# Patient Record
Sex: FEMALE | Race: WHITE | NOT HISPANIC OR LATINO | Employment: FULL TIME | URBAN - METROPOLITAN AREA
[De-identification: names, ages, dates, MRNs, and addresses within clinical notes are randomized per-mention and may not be internally consistent; named-entity substitution may affect disease eponyms.]

---

## 2019-06-28 ENCOUNTER — HOSPITAL ENCOUNTER (EMERGENCY)
Facility: MEDICAL CENTER | Age: 54
End: 2019-06-28
Attending: EMERGENCY MEDICINE
Payer: COMMERCIAL

## 2019-06-28 VITALS
HEART RATE: 56 BPM | SYSTOLIC BLOOD PRESSURE: 105 MMHG | OXYGEN SATURATION: 100 % | HEIGHT: 67 IN | WEIGHT: 130.07 LBS | TEMPERATURE: 97.1 F | DIASTOLIC BLOOD PRESSURE: 64 MMHG | RESPIRATION RATE: 18 BRPM | BODY MASS INDEX: 20.42 KG/M2

## 2019-06-28 DIAGNOSIS — N39.0 URINARY TRACT INFECTION WITHOUT HEMATURIA, SITE UNSPECIFIED: ICD-10-CM

## 2019-06-28 DIAGNOSIS — R11.10 VOMITING AND DIARRHEA: ICD-10-CM

## 2019-06-28 DIAGNOSIS — E86.0 DEHYDRATION: ICD-10-CM

## 2019-06-28 DIAGNOSIS — R19.7 VOMITING AND DIARRHEA: ICD-10-CM

## 2019-06-28 DIAGNOSIS — A08.4 VIRAL GASTROENTERITIS: ICD-10-CM

## 2019-06-28 LAB
ALBUMIN SERPL BCP-MCNC: 4 G/DL (ref 3.2–4.9)
ALBUMIN/GLOB SERPL: 1.8 G/DL
ALP SERPL-CCNC: 64 U/L (ref 30–99)
ALT SERPL-CCNC: 24 U/L (ref 2–50)
ANION GAP SERPL CALC-SCNC: 10 MMOL/L (ref 0–11.9)
APPEARANCE UR: ABNORMAL
AST SERPL-CCNC: 26 U/L (ref 12–45)
BACTERIA #/AREA URNS HPF: ABNORMAL /HPF
BASOPHILS # BLD AUTO: 1.4 % (ref 0–1.8)
BASOPHILS # BLD: 0.05 K/UL (ref 0–0.12)
BILIRUB SERPL-MCNC: 0.4 MG/DL (ref 0.1–1.5)
BILIRUB UR QL STRIP.AUTO: NEGATIVE
BUN SERPL-MCNC: 19 MG/DL (ref 8–22)
CALCIUM SERPL-MCNC: 8.6 MG/DL (ref 8.5–10.5)
CHLORIDE SERPL-SCNC: 114 MMOL/L (ref 96–112)
CO2 SERPL-SCNC: 20 MMOL/L (ref 20–33)
COLOR UR: YELLOW
CREAT SERPL-MCNC: 0.9 MG/DL (ref 0.5–1.4)
EOSINOPHIL # BLD AUTO: 0.08 K/UL (ref 0–0.51)
EOSINOPHIL NFR BLD: 2.2 % (ref 0–6.9)
EPI CELLS #/AREA URNS HPF: ABNORMAL /HPF
ERYTHROCYTE [DISTWIDTH] IN BLOOD BY AUTOMATED COUNT: 51.3 FL (ref 35.9–50)
GLOBULIN SER CALC-MCNC: 2.2 G/DL (ref 1.9–3.5)
GLUCOSE SERPL-MCNC: 81 MG/DL (ref 65–99)
GLUCOSE UR STRIP.AUTO-MCNC: NEGATIVE MG/DL
HCT VFR BLD AUTO: 41.9 % (ref 37–47)
HGB BLD-MCNC: 13.3 G/DL (ref 12–16)
HYALINE CASTS #/AREA URNS LPF: ABNORMAL /LPF
IMM GRANULOCYTES # BLD AUTO: 0.01 K/UL (ref 0–0.11)
IMM GRANULOCYTES NFR BLD AUTO: 0.3 % (ref 0–0.9)
KETONES UR STRIP.AUTO-MCNC: 15 MG/DL
LEUKOCYTE ESTERASE UR QL STRIP.AUTO: ABNORMAL
LIPASE SERPL-CCNC: 18 U/L (ref 11–82)
LYMPHOCYTES # BLD AUTO: 1.67 K/UL (ref 1–4.8)
LYMPHOCYTES NFR BLD: 46.1 % (ref 22–41)
MCH RBC QN AUTO: 30 PG (ref 27–33)
MCHC RBC AUTO-ENTMCNC: 31.7 G/DL (ref 33.6–35)
MCV RBC AUTO: 94.6 FL (ref 81.4–97.8)
MICRO URNS: ABNORMAL
MONOCYTES # BLD AUTO: 0.33 K/UL (ref 0–0.85)
MONOCYTES NFR BLD AUTO: 9.1 % (ref 0–13.4)
NEUTROPHILS # BLD AUTO: 1.48 K/UL (ref 2–7.15)
NEUTROPHILS NFR BLD: 40.9 % (ref 44–72)
NITRITE UR QL STRIP.AUTO: POSITIVE
NRBC # BLD AUTO: 0 K/UL
NRBC BLD-RTO: 0 /100 WBC
PH UR STRIP.AUTO: 6 [PH]
PLATELET # BLD AUTO: 242 K/UL (ref 164–446)
PMV BLD AUTO: 10.3 FL (ref 9–12.9)
POTASSIUM SERPL-SCNC: 3.5 MMOL/L (ref 3.6–5.5)
PROT SERPL-MCNC: 6.2 G/DL (ref 6–8.2)
PROT UR QL STRIP: NEGATIVE MG/DL
RBC # BLD AUTO: 4.43 M/UL (ref 4.2–5.4)
RBC # URNS HPF: ABNORMAL /HPF
RBC UR QL AUTO: NEGATIVE
SODIUM SERPL-SCNC: 144 MMOL/L (ref 135–145)
SP GR UR STRIP.AUTO: 1.02
UROBILINOGEN UR STRIP.AUTO-MCNC: 1 MG/DL
WBC # BLD AUTO: 3.6 K/UL (ref 4.8–10.8)
WBC #/AREA URNS HPF: ABNORMAL /HPF

## 2019-06-28 PROCEDURE — 87077 CULTURE AEROBIC IDENTIFY: CPT

## 2019-06-28 PROCEDURE — 81001 URINALYSIS AUTO W/SCOPE: CPT

## 2019-06-28 PROCEDURE — 83690 ASSAY OF LIPASE: CPT

## 2019-06-28 PROCEDURE — 700105 HCHG RX REV CODE 258: Performed by: EMERGENCY MEDICINE

## 2019-06-28 PROCEDURE — 80053 COMPREHEN METABOLIC PANEL: CPT

## 2019-06-28 PROCEDURE — 96375 TX/PRO/DX INJ NEW DRUG ADDON: CPT

## 2019-06-28 PROCEDURE — 87086 URINE CULTURE/COLONY COUNT: CPT

## 2019-06-28 PROCEDURE — 85025 COMPLETE CBC W/AUTO DIFF WBC: CPT

## 2019-06-28 PROCEDURE — 99285 EMERGENCY DEPT VISIT HI MDM: CPT

## 2019-06-28 PROCEDURE — 96365 THER/PROPH/DIAG IV INF INIT: CPT

## 2019-06-28 PROCEDURE — 87186 SC STD MICRODIL/AGAR DIL: CPT

## 2019-06-28 PROCEDURE — 700111 HCHG RX REV CODE 636 W/ 250 OVERRIDE (IP): Performed by: EMERGENCY MEDICINE

## 2019-06-28 RX ORDER — LEVOTHYROXINE SODIUM 0.03 MG/1
25 TABLET ORAL
COMMUNITY
End: 2019-09-27 | Stop reason: SDUPTHER

## 2019-06-28 RX ORDER — OMEPRAZOLE 20 MG/1
20 CAPSULE, DELAYED RELEASE ORAL DAILY
COMMUNITY
End: 2019-09-27 | Stop reason: SDUPTHER

## 2019-06-28 RX ORDER — SODIUM CHLORIDE 9 MG/ML
1000 INJECTION, SOLUTION INTRAVENOUS ONCE
Status: COMPLETED | OUTPATIENT
Start: 2019-06-28 | End: 2019-06-28

## 2019-06-28 RX ORDER — TOPIRAMATE 100 MG/1
50-100 TABLET, FILM COATED ORAL 3 TIMES DAILY
COMMUNITY
End: 2019-09-27 | Stop reason: SDUPTHER

## 2019-06-28 RX ORDER — ATORVASTATIN CALCIUM 10 MG/1
10 TABLET, FILM COATED ORAL DAILY
COMMUNITY
End: 2019-09-27 | Stop reason: SDUPTHER

## 2019-06-28 RX ORDER — NITROFURANTOIN 25; 75 MG/1; MG/1
100 CAPSULE ORAL 2 TIMES DAILY
Qty: 14 CAP | Refills: 0 | Status: SHIPPED | OUTPATIENT
Start: 2019-06-28 | End: 2019-07-05

## 2019-06-28 RX ORDER — FOLIC ACID 1 MG/1
1 TABLET ORAL DAILY
COMMUNITY
End: 2019-09-27 | Stop reason: SDUPTHER

## 2019-06-28 RX ORDER — DOCUSATE SODIUM 100 MG/1
100 CAPSULE, LIQUID FILLED ORAL DAILY
COMMUNITY
End: 2019-09-27 | Stop reason: SDUPTHER

## 2019-06-28 RX ORDER — BUPROPION HYDROCHLORIDE 300 MG/1
300 TABLET ORAL EVERY MORNING
COMMUNITY
End: 2019-09-27 | Stop reason: SDUPTHER

## 2019-06-28 RX ORDER — ONDANSETRON 4 MG/1
4 TABLET, ORALLY DISINTEGRATING ORAL EVERY 8 HOURS PRN
Qty: 10 TAB | Refills: 1 | Status: SHIPPED | OUTPATIENT
Start: 2019-06-28 | End: 2019-09-27

## 2019-06-28 RX ORDER — ONDANSETRON 2 MG/ML
4 INJECTION INTRAMUSCULAR; INTRAVENOUS ONCE
Status: COMPLETED | OUTPATIENT
Start: 2019-06-28 | End: 2019-06-28

## 2019-06-28 RX ADMIN — ONDANSETRON 4 MG: 2 INJECTION INTRAMUSCULAR; INTRAVENOUS at 08:42

## 2019-06-28 RX ADMIN — SODIUM CHLORIDE 1000 ML: 9 INJECTION, SOLUTION INTRAVENOUS at 08:29

## 2019-06-28 RX ADMIN — CEFTRIAXONE SODIUM 1 G: 1 INJECTION, POWDER, FOR SOLUTION INTRAMUSCULAR; INTRAVENOUS at 10:00

## 2019-06-28 NOTE — ED TRIAGE NOTES
Pt unable to keep food or water down in 3 days. Passing gas.  C/o n/v/d, weakness.   Speaking in full sentences, NAD noted.

## 2019-06-28 NOTE — ED PROVIDER NOTES
ED Provider Note    CHIEF COMPLAINT  Chief Complaint   Patient presents with   • N/V   • Diarrhea   • Dehydration       HPI  Sinai Lopez is a 54 y.o. female who presents with vomiting and diarrhea onset 3 days ago gradually.  Patient states that she vomits soon after taking any oral intake, which was worse today.  She has history of gastric bypass, states vomiting is been a problem for her in the past however not diarrhea.  No dysuria.  She has feelings of general malaise, denies fever at this time.  Patient has a pain in her chest when she swallows, she states this is usual for her since her gastric bypass surgery.  No pleurisy.  Patient feels generally weak, was concerned about dehydration.  No bloody emesis or stool.  No recent antibiotic use.  Patient has intermittent abdominal cramping, currently resolved however    REVIEW OF SYSTEMS  Constitutional: General malaise  Respiratory: No shortness of breath  Cardiac: No chest pain or syncope  Gastrointestinal: Abdominal cramping, vomiting and diarrhea  Musculoskeletal: No acute neck or back pain.  No flank pain  Neurologic: No headache  Genitourinary: No dysuria.  States her urine appears cloudy, concentrated       All other systems are negative.     PAST MEDICAL HISTORY  History reviewed. No pertinent past medical history.    FAMILY HISTORY  History reviewed. No pertinent family history.    SOCIAL HISTORY  Social History     Social History   • Marital status: N/A     Spouse name: N/A   • Number of children: N/A   • Years of education: N/A     Social History Main Topics   • Smoking status: Current Every Day Smoker   • Smokeless tobacco: Never Used   • Alcohol use Yes   • Drug use: No   • Sexual activity: Not on file     Other Topics Concern   • Not on file     Social History Narrative   • No narrative on file       SURGICAL HISTORY  History reviewed. No pertinent surgical history.    CURRENT MEDICATIONS  Home Medications     Reviewed by Adelia BREWER  "[place], Pharmacy Intern (Pharmacy Intern) on 06/28/19 at 0830  Med List Status: Complete   Medication Last Dose Status   atorvastatin (LIPITOR) 10 MG Tab 6/28/2019 Active   buPROPion (WELLBUTRIN XL) 300 MG XL tablet 6/28/2019 Active   docusate sodium (COLACE) 100 MG Cap 6/28/2019 Active   folic acid (FOLVITE) 1 MG Tab 6/28/2019 Active   levothyroxine (SYNTHROID) 25 MCG Tab 6/28/2019 Active   omeprazole (PRILOSEC) 20 MG delayed-release capsule 6/28/2019 Active   topiramate (TOPAMAX) 100 MG Tab 6/28/2019 Active                ALLERGIES  Allergies   Allergen Reactions   • Contrast Media With Iodine [Iodine]    • Shellfish Allergy        PHYSICAL EXAM  VITAL SIGNS: /64   Pulse (!) 56   Temp 36.2 °C (97.1 °F) (Temporal)   Resp 18   Ht 1.702 m (5' 7\")   Wt 59 kg (130 lb 1.1 oz)   SpO2 100%   BMI 20.37 kg/m²   Constitutional: No distress.  ENT: Nares clear, mucous membranes dry.  Eyes:  Conjunctiva normal, No discharge.    Lymphatic: No adenopathy.   Cardiovascular: Normal heart rate, Normal rhythm.   Pulmonary: No wheezing, no rales  Gastrointestinal: No abdominal swelling.  No tenderness.  Bowel sounds active.  No pain over McBurney's point  Skin: Warm, Dry, No jaundice.   Musculoskeletal:  No CVA tenderness.   Neurologic:  Normal motor and sensory function, No focal deficits noted.   Psychiatric:Normal affect, Normal mood.    RADIOLOGY/PROCEDURES/Labs  Results for orders placed or performed during the hospital encounter of 06/28/19   CBC WITH DIFFERENTIAL   Result Value Ref Range    WBC 3.6 (L) 4.8 - 10.8 K/uL    RBC 4.43 4.20 - 5.40 M/uL    Hemoglobin 13.3 12.0 - 16.0 g/dL    Hematocrit 41.9 37.0 - 47.0 %    MCV 94.6 81.4 - 97.8 fL    MCH 30.0 27.0 - 33.0 pg    MCHC 31.7 (L) 33.6 - 35.0 g/dL    RDW 51.3 (H) 35.9 - 50.0 fL    Platelet Count 242 164 - 446 K/uL    MPV 10.3 9.0 - 12.9 fL    Neutrophils-Polys 40.90 (L) 44.00 - 72.00 %    Lymphocytes 46.10 (H) 22.00 - 41.00 %    Monocytes 9.10 0.00 - 13.40 %    " Eosinophils 2.20 0.00 - 6.90 %    Basophils 1.40 0.00 - 1.80 %    Immature Granulocytes 0.30 0.00 - 0.90 %    Nucleated RBC 0.00 /100 WBC    Neutrophils (Absolute) 1.48 (L) 2.00 - 7.15 K/uL    Lymphs (Absolute) 1.67 1.00 - 4.80 K/uL    Monos (Absolute) 0.33 0.00 - 0.85 K/uL    Eos (Absolute) 0.08 0.00 - 0.51 K/uL    Baso (Absolute) 0.05 0.00 - 0.12 K/uL    Immature Granulocytes (abs) 0.01 0.00 - 0.11 K/uL    NRBC (Absolute) 0.00 K/uL   COMP METABOLIC PANEL   Result Value Ref Range    Sodium 144 135 - 145 mmol/L    Potassium 3.5 (L) 3.6 - 5.5 mmol/L    Chloride 114 (H) 96 - 112 mmol/L    Co2 20 20 - 33 mmol/L    Anion Gap 10.0 0.0 - 11.9    Glucose 81 65 - 99 mg/dL    Bun 19 8 - 22 mg/dL    Creatinine 0.90 0.50 - 1.40 mg/dL    Calcium 8.6 8.5 - 10.5 mg/dL    AST(SGOT) 26 12 - 45 U/L    ALT(SGPT) 24 2 - 50 U/L    Alkaline Phosphatase 64 30 - 99 U/L    Total Bilirubin 0.4 0.1 - 1.5 mg/dL    Albumin 4.0 3.2 - 4.9 g/dL    Total Protein 6.2 6.0 - 8.2 g/dL    Globulin 2.2 1.9 - 3.5 g/dL    A-G Ratio 1.8 g/dL   LIPASE   Result Value Ref Range    Lipase 18 11 - 82 U/L   URINALYSIS CULTURE, IF INDICATED   Result Value Ref Range    Color Yellow     Character Hazy (A)     Specific Gravity 1.025 <1.035    Ph 6.0 5.0 - 8.0    Glucose Negative Negative mg/dL    Ketones 15 (A) Negative mg/dL    Protein Negative Negative mg/dL    Bilirubin Negative Negative    Urobilinogen, Urine 1.0 Negative    Nitrite Positive (A) Negative    Leukocyte Esterase Trace (A) Negative    Occult Blood Negative Negative    Micro Urine Req Microscopic    URINE MICROSCOPIC (W/UA)   Result Value Ref Range    WBC 10-20 (A) /hpf    RBC 0-2 /hpf    Bacteria Many (A) None /hpf    Epithelial Cells Many (A) /hpf    Hyaline Cast 0-2 /lpf   ESTIMATED GFR   Result Value Ref Range    GFR If African American >60 >60 mL/min/1.73 m 2    GFR If Non African American >60 >60 mL/min/1.73 m 2         COURSE & MEDICAL DECISION MAKING  Pertinent Labs & Imaging studies  reviewed. (See chart for details)  Patient with evidence of urinary tract infection, nitrite positive urine will be sent for culture.  Patient given initial dose of Rocephin in the ER, has been prescribed a one-week course of Macrobid.  Patient with evidence of dehydration was given IV fluids.  She felt much better after Zofran, now able to drink without vomiting.  Reevaluation of the abdomen shows it to remain soft and nontender.  Patient has agreed to return if worse, suspect viral etiology of vomiting and diarrhea given lymphocytosis, leukopenia.  Patient is prescribed Zofran and discharged in improved condition  HYDRATION: Based on the patient's presentation of Acute Diarrhea, Acute Vomiting and Dehydration the patient was given IV fluids. IV Hydration was used because oral hydration failed due to Vomiting. Upon recheck following hydration, the patient was Improved, stating she felt less weak.      FINAL IMPRESSION  1. Dehydration    2. Vomiting and diarrhea    3. Viral gastroenteritis    4. Urinary tract infection without hematuria, site unspecified            Electronically signed by: Cuco Pereira, 6/28/2019 1:58 PM

## 2019-06-30 LAB
BACTERIA UR CULT: ABNORMAL
BACTERIA UR CULT: ABNORMAL
SIGNIFICANT IND 70042: ABNORMAL
SITE SITE: ABNORMAL
SOURCE SOURCE: ABNORMAL

## 2019-06-30 NOTE — ED NOTES
ED Positive Culture Follow-up/Notification Note:   Date: 6/30/19    Patient seen in the ED on 6/28/2019 for n/v, diarrhea.   1. Dehydration    2. Vomiting and diarrhea    3. Viral gastroenteritis    4. Urinary tract infection without hematuria, site unspecified      Discharge Medication List as of 6/28/2019 11:10 AM      START taking these medications    Details   nitrofurantoin monohyd macro (MACROBID) 100 MG Cap Take 1 Cap by mouth 2 times a day for 7 days., Disp-14 Cap, R-0, Print Rx Paper      ondansetron (ZOFRAN ODT) 4 MG TABLET DISPERSIBLE Take 1 Tab by mouth every 8 hours as needed., Disp-10 Tab, R-1, Print Rx Paper           Allergies: Contrast media with iodine [iodine] and Shellfish allergy    Vitals:    06/28/19 0831 06/28/19 0928 06/28/19 0931 06/28/19 1050   BP: 105/64      Pulse: 80 60 (!) 59 (!) 56   Resp: 18 18 18 18   Temp:       TempSrc:       SpO2: 99% 96% 96% 100%   Weight:       Height:           Final cultures:   Results     Procedure Component Value Units Date/Time    URINE CULTURE(NEW) [[id number]]  (Abnormal)  (Susceptibility) Collected:  06/28/19 0828    Order Status:  Completed Specimen:  Urine Updated:  06/30/19 0933     Significant Indicator POS (POS)     Source UR     Site -     Culture Result - (A)      Klebsiella pneumoniae  ,000 cfu/mL   (A)    Culture & Susceptibility     KLEBSIELLA PNEUMONIAE     Antibiotic Sensitivity Microscan Unit Status    Ampicillin Resistant >16 mcg/mL Final    Method: BREEZY    Cefepime Sensitive <=8 mcg/mL Final    Method: BREEZY    Cefotaxime Sensitive <=2 mcg/mL Final    Method: BREEZY    Cefotetan Sensitive <=16 mcg/mL Final    Method: BREEZY    Ceftazidime Sensitive <=1 mcg/mL Final    Method: BREEZY    Ceftriaxone Sensitive <=8 mcg/mL Final    Method: BREEZY    Cefuroxime Sensitive <=4 mcg/mL Final    Method: BREEZY    Cephalothin Sensitive <=8 mcg/mL Final    Method: BREEZY    Ciprofloxacin Sensitive <=1 mcg/mL Final    Method: BREEZY    Gentamicin Sensitive <=4 mcg/mL  Final    Method: BREEZY    Levofloxacin Sensitive <=2 mcg/mL Final    Method: BREEZY    Nitrofurantoin Intermediate 64 mcg/mL Final    Method: BREEZY    Pip/Tazobactam Sensitive <=16 mcg/mL Final    Method: BREEZY    Piperacillin Resistant >64 mcg/mL Final    Method: BREEZY    Tigecycline Sensitive <=2 mcg/mL Final    Method: BREEZY    Tobramycin Sensitive <=4 mcg/mL Final    Method: BREEZY    Trimeth/Sulfa Sensitive <=2/38 mcg/mL Final    Method: BREEZY                       URINALYSIS CULTURE, IF INDICATED [[id number]]  (Abnormal) Collected:  06/28/19 0828    Order Status:  Completed Specimen:  Urine Updated:  06/28/19 0851     Color Yellow     Character Hazy (A)     Specific Gravity 1.025     Ph 6.0     Glucose Negative mg/dL      Ketones 15 (A) mg/dL      Protein Negative mg/dL      Bilirubin Negative     Urobilinogen, Urine 1.0     Nitrite Positive (A)     Leukocyte Esterase Trace (A)     Occult Blood Negative     Micro Urine Req Microscopic          Plan:   Isolated organism is intermediate to prescribed therapy.  No contact information available in chart.   Recommend transition to bactrim DS BID x 3 days if patient returns and is symptomatic.   No address on file for letter to be sent.       Tristian Bray

## 2019-09-27 ENCOUNTER — TELEPHONE (OUTPATIENT)
Dept: MEDICAL GROUP | Facility: MEDICAL CENTER | Age: 54
End: 2019-09-27

## 2019-09-27 ENCOUNTER — OFFICE VISIT (OUTPATIENT)
Dept: MEDICAL GROUP | Facility: MEDICAL CENTER | Age: 54
End: 2019-09-27
Attending: NURSE PRACTITIONER
Payer: MEDICAID

## 2019-09-27 VITALS
TEMPERATURE: 98.9 F | SYSTOLIC BLOOD PRESSURE: 110 MMHG | HEART RATE: 69 BPM | OXYGEN SATURATION: 94 % | BODY MASS INDEX: 22.13 KG/M2 | WEIGHT: 141 LBS | DIASTOLIC BLOOD PRESSURE: 64 MMHG | HEIGHT: 67 IN | RESPIRATION RATE: 16 BRPM

## 2019-09-27 DIAGNOSIS — Z98.84 HX OF GASTRIC BYPASS: ICD-10-CM

## 2019-09-27 DIAGNOSIS — Z11.59 NEED FOR HEPATITIS C SCREENING TEST: ICD-10-CM

## 2019-09-27 DIAGNOSIS — K21.9 GASTROESOPHAGEAL REFLUX DISEASE, ESOPHAGITIS PRESENCE NOT SPECIFIED: ICD-10-CM

## 2019-09-27 DIAGNOSIS — Z12.11 SCREEN FOR COLON CANCER: ICD-10-CM

## 2019-09-27 DIAGNOSIS — Z13.21 ENCOUNTER FOR VITAMIN DEFICIENCY SCREENING: ICD-10-CM

## 2019-09-27 DIAGNOSIS — Z11.4 ENCOUNTER FOR SCREENING FOR HIV: ICD-10-CM

## 2019-09-27 DIAGNOSIS — E78.49 OTHER HYPERLIPIDEMIA: ICD-10-CM

## 2019-09-27 DIAGNOSIS — Z13.6 SCREENING FOR CARDIOVASCULAR CONDITION: ICD-10-CM

## 2019-09-27 DIAGNOSIS — K43.2 INCISIONAL HERNIA, WITHOUT OBSTRUCTION OR GANGRENE: ICD-10-CM

## 2019-09-27 DIAGNOSIS — M54.32 LEFT SIDED SCIATICA: ICD-10-CM

## 2019-09-27 DIAGNOSIS — Z13.228 SCREENING FOR METABOLIC DISORDER: ICD-10-CM

## 2019-09-27 DIAGNOSIS — R11.15 NON-INTRACTABLE CYCLICAL VOMITING WITHOUT NAUSEA: ICD-10-CM

## 2019-09-27 DIAGNOSIS — Z11.3 ROUTINE SCREENING FOR STI (SEXUALLY TRANSMITTED INFECTION): ICD-10-CM

## 2019-09-27 DIAGNOSIS — Z13.0 SCREENING FOR DEFICIENCY ANEMIA: ICD-10-CM

## 2019-09-27 DIAGNOSIS — E03.8 OTHER SPECIFIED HYPOTHYROIDISM: ICD-10-CM

## 2019-09-27 DIAGNOSIS — H18.9 DISORDER OF CORNEA: ICD-10-CM

## 2019-09-27 DIAGNOSIS — Z12.31 ENCOUNTER FOR SCREENING MAMMOGRAM FOR MALIGNANT NEOPLASM OF BREAST: ICD-10-CM

## 2019-09-27 DIAGNOSIS — Z76.89 ENCOUNTER TO ESTABLISH CARE: ICD-10-CM

## 2019-09-27 DIAGNOSIS — F31.9 BIPOLAR 1 DISORDER (HCC): ICD-10-CM

## 2019-09-27 PROCEDURE — 99204 OFFICE O/P NEW MOD 45 MIN: CPT | Performed by: NURSE PRACTITIONER

## 2019-09-27 PROCEDURE — 99212 OFFICE O/P EST SF 10 MIN: CPT | Performed by: NURSE PRACTITIONER

## 2019-09-27 RX ORDER — DOCUSATE SODIUM 100 MG/1
100 CAPSULE, LIQUID FILLED ORAL 2 TIMES DAILY
Qty: 60 CAP | Refills: 11 | Status: SHIPPED | OUTPATIENT
Start: 2019-09-27

## 2019-09-27 RX ORDER — TOPIRAMATE 100 MG/1
100-150 TABLET, FILM COATED ORAL 2 TIMES DAILY
Qty: 75 TAB | Refills: 3 | Status: SHIPPED | OUTPATIENT
Start: 2019-09-27 | End: 2019-10-29

## 2019-09-27 RX ORDER — BUPROPION HYDROCHLORIDE 300 MG/1
300 TABLET ORAL EVERY MORNING
Qty: 30 TAB | Refills: 11 | Status: SHIPPED | OUTPATIENT
Start: 2019-09-27 | End: 2019-10-29

## 2019-09-27 RX ORDER — LEVOTHYROXINE SODIUM 0.03 MG/1
25 TABLET ORAL
Qty: 30 TAB | Refills: 1 | Status: SHIPPED | OUTPATIENT
Start: 2019-09-27 | End: 2019-12-02 | Stop reason: SDUPTHER

## 2019-09-27 RX ORDER — FOLIC ACID 1 MG/1
1 TABLET ORAL DAILY
Qty: 30 TAB | Refills: 11 | Status: SHIPPED | OUTPATIENT
Start: 2019-09-27

## 2019-09-27 RX ORDER — OMEPRAZOLE 20 MG/1
20 CAPSULE, DELAYED RELEASE ORAL DAILY
Qty: 30 CAP | Refills: 5 | Status: SHIPPED | OUTPATIENT
Start: 2019-09-27 | End: 2019-10-02 | Stop reason: CLARIF

## 2019-09-27 RX ORDER — ATORVASTATIN CALCIUM 10 MG/1
10 TABLET, FILM COATED ORAL DAILY
Qty: 30 TAB | Refills: 5 | Status: SHIPPED | OUTPATIENT
Start: 2019-09-27 | End: 2020-04-20

## 2019-09-27 RX ORDER — DICYCLOMINE HYDROCHLORIDE 10 MG/1
10 CAPSULE ORAL
COMMUNITY
End: 2019-10-29

## 2019-09-27 ASSESSMENT — ENCOUNTER SYMPTOMS
DIARRHEA: 0
SHORTNESS OF BREATH: 0
ABDOMINAL PAIN: 1
WHEEZING: 0
BLOOD IN STOOL: 0
PALPITATIONS: 0
FEVER: 0
COUGH: 0
CHILLS: 0
WEIGHT LOSS: 0
CONSTIPATION: 1

## 2019-09-27 ASSESSMENT — PATIENT HEALTH QUESTIONNAIRE - PHQ9: CLINICAL INTERPRETATION OF PHQ2 SCORE: 0

## 2019-09-27 NOTE — ASSESSMENT & PLAN NOTE
Started 3 weeks ago with a fall.  I feels like a hot poker on her ischia, with some radiation down her leg.  She does not take pain medication d/t her abuse hx.  Sitting aggravates it.  Distraction helps.

## 2019-09-27 NOTE — ASSESSMENT & PLAN NOTE
Onset: childhood with hyper, then a couple years ago with hypo  Current treatment: synthroid 25 mcg  Last dose adjustment: last year  Last thyroid labs: last year  History of thyroid nodules or thyroid surgery: NO  Associated symptoms: Denies  fatigue, constipation, dry skin, cold intolerance, weight gain, shortness of breath, irregular menses, carpal tunnel symptoms, myalgias

## 2019-09-27 NOTE — ASSESSMENT & PLAN NOTE
Onset: about 5 years ago  Current treatment: lipitor 10mg   ASCVD risk score: unable to calculate without lipid panel.   Last lipid panel: ordered today

## 2019-09-27 NOTE — TELEPHONE ENCOUNTER
DOCUMENTATION OF PAR STATUS:    1. Name of Medication & Dose: omparzole 20 mg     2. Name of Prescription Coverage Company & phone #:       3. Date Prior Auth Submitted: 9/27/19    4. What information was given to obtain insurance decision?      5. Prior Auth Status?  Pending    6. Patient Notified: yes

## 2019-09-27 NOTE — PROGRESS NOTES
"Chief Complaint   Patient presents with   • Establish Care   • Medication Refill       Subjective:     HPI:   Sinai Lopez is a 54 y.o. female here to establish care, medication refill, vomiting concerns,  and to discuss the evaluation and management of:      Encounter to establish care  Prior PCP: from [facility] in [place], [place]    Other Providers:      Non-intractable cyclical vomiting without nausea  Reports she vomits after most meals.  She had a harry en y in 1999.  She had a post op incisional hernia.  She reports that she vomits when she feels full after eating.  this has been happening for years.  She reports she thinks this is a combination of a psychological and physiological causes.  She reports she struggles with anxiety when she feels full d/t her hx (she was 500lbs when she had kimberly sx) and she thinks this leads to her vomiting.     Incisional hernia, without obstruction or gangrene  Incisonal hernia repair with mesh s/p kimberly sx in 1999.  She has had discomfort in the region for several years.  She does note that one area feels more \"rubbery\" than usual, but does not specifically identify intestines.      Other specified hypothyroidism  Onset: childhood with hyper, then a couple years ago with hypo  Current treatment: synthroid 25 mcg  Last dose adjustment: last year  Last thyroid labs: last year  History of thyroid nodules or thyroid surgery: NO  Associated symptoms: Denies  fatigue, constipation, dry skin, cold intolerance, weight gain, shortness of breath, irregular menses, carpal tunnel symptoms, myalgias        Other hyperlipidemia  Onset: about 5 years ago  Current treatment: lipitor 10mg   ASCVD risk score: unable to calculate without lipid panel.   Last lipid panel: ordered today       Left sided sciatica  Started 3 weeks ago with a fall.  I feels like a hot poker on her ischia, with some radiation down her leg.  She does not take pain medication d/t her abuse hx.  Sitting " aggravates it.  Distraction helps.     Gastroesophageal reflux disease  Patient reports has has had reflux for many years.  She says she is relatively well controlled on omeprazole.  She does have epigastric pain and vomiting with almost every meal.  She was told she has hiatal hernia after a lap rené last year.     Disorder of cornea  Uncertain what the problem was, but she was told she has too much fluid in her cornea and would need a cornea specialist.  Unfortunately she never followed up.       ROS  Review of Systems   Constitutional: Positive for malaise/fatigue. Negative for chills, fever and weight loss.   Respiratory: Negative for cough, shortness of breath and wheezing.    Cardiovascular: Negative for chest pain, palpitations and leg swelling.   Gastrointestinal: Positive for abdominal pain and constipation. Negative for blood in stool and diarrhea.         Allergies   Allergen Reactions   • Contrast Media With Iodine [Iodine]    • Shellfish Allergy        Current medicines (including changes today)  Current Outpatient Medications   Medication Sig Dispense Refill   • dicyclomine (BENTYL) 10 MG Cap Take 10 mg by mouth 4 Times a Day,Before Meals and at Bedtime.     • topiramate (TOPAMAX) 100 MG Tab Take 1-1.5 Tabs by mouth 2 times a day. 150 mg at NOON and 100 mg at bedtime 75 Tab 3   • omeprazole (PRILOSEC) 20 MG delayed-release capsule Take 1 Cap by mouth every day. 30 Cap 5   • levothyroxine (SYNTHROID) 25 MCG Tab Take 1 Tab by mouth Every morning on an empty stomach. 30 Tab 1   • folic acid (FOLVITE) 1 MG Tab Take 1 Tab by mouth every day. 30 Tab 11   • docusate sodium (COLACE) 100 MG Cap Take 1 Cap by mouth 2 times a day. 60 Cap 11   • buPROPion (WELLBUTRIN XL) 300 MG XL tablet Take 1 Tab by mouth every morning. 30 Tab 11   • atorvastatin (LIPITOR) 10 MG Tab Take 1 Tab by mouth every day. 30 Tab 5     No current facility-administered medications for this visit.      She  has a past medical history of  "Anxiety, Bipolar 1 disorder (HCC), Depression, and Thyroid disease.  She  has a past surgical history that includes gastric bypass laparoscopic (1999); abdominal hysterectomy total; and hernia repair, incisional, unilateral.  Social History     Tobacco Use   • Smoking status: Current Every Day Smoker   • Smokeless tobacco: Never Used   Substance Use Topics   • Alcohol use: Not Currently     Comment: sober 7 years, EtOh for 16yrs   • Drug use: Not Currently     Comment: sober 7 years, 30 +year use       History reviewed. No pertinent family history.  No family status information on file.       Patient Active Problem List    Diagnosis Date Noted   • Encounter to establish care 09/27/2019   • Non-intractable cyclical vomiting without nausea 09/27/2019   • Incisional hernia, without obstruction or gangrene 09/27/2019   • Other specified hypothyroidism 09/27/2019   • Other hyperlipidemia 09/27/2019   • Left sided sciatica 09/27/2019   • Gastroesophageal reflux disease 09/27/2019   • Disorder of cornea 09/27/2019   • Hx of gastric bypass 09/27/2019          Objective:     /64   Pulse 69   Temp 37.2 °C (98.9 °F) (Temporal)   Resp 16   Ht 1.702 m (5' 7\")   Wt 64 kg (141 lb)   SpO2 94%  Body mass index is 22.08 kg/m².    Physical Exam:  Physical Exam   Constitutional: She is oriented to person, place, and time and well-developed, well-nourished, and in no distress. No distress.   HENT:   Head: Normocephalic.   Right Ear: Tympanic membrane and external ear normal.   Left Ear: Tympanic membrane and external ear normal.   Eyes: Pupils are equal, round, and reactive to light. Conjunctivae and EOM are normal.   Neck: Normal range of motion. Neck supple. No tracheal deviation present.   Cardiovascular: Normal rate, regular rhythm, normal heart sounds and intact distal pulses.   Pulmonary/Chest: Effort normal and breath sounds normal.   Abdominal: Soft. Bowel sounds are normal.   Musculoskeletal: Normal range of motion. "   Lymphadenopathy:        Head (right side): No preauricular adenopathy present.        Head (left side): No preauricular adenopathy present.     She has no cervical adenopathy.   Neurological: She is alert and oriented to person, place, and time. She has normal sensation, normal strength and intact cranial nerves. Gait normal.   Skin: Skin is warm and dry.   Psychiatric: Affect and judgment normal.          Assessment and Plan:     The following treatment plan was discussed:    1. Bipolar 1 disorder (HCC)  REFERRAL TO PSYCHIATRY    topiramate (TOPAMAX) 100 MG Tab  buPROPion (WELLBUTRIN XL) 300 MG XL tablet   2. Encounter to establish care     3. Non-intractable cyclical vomiting without nausea  REFERRAL TO GASTROENTEROLOGY  -The patient reports that she thinks that this is a combination of psychological and physiological occurrence.  Considering her history of hiatal hernia I would like her to be worked up by GI.   4. Incisional hernia, without obstruction or gangrene  US-HERNIA ABDOMEN       5. Other specified hypothyroidism  levothyroxine (SYNTHROID) 25 MCG Tab  -I have provided a one-month refill, we are checking her thyroid levels to determine appropriate dose.   6. Gastroesophageal reflux disease, esophagitis presence not specified  omeprazole (PRILOSEC) 20 MG delayed-release capsule    REFERRAL TO GASTROENTEROLOGY   7. Disorder of cornea  REFERRAL TO OPHTHALMOLOGY  -She is unsure what was wrong with her cornea, therefore referring to ophthalmology.   8. Hx of gastric bypass  VITAMIN B12    folic acid (FOLVITE) 1 MG Tab    docusate sodium (COLACE) 100 MG Cap        REFERRAL TO GASTROENTEROLOGY   9. Other hyperlipidemia  atorvastatin (LIPITOR) 10 MG Tab   10. Left sided sciatica  REFERRAL TO PHYSICAL THERAPY Reason for Therapy: Eval/Treat/Report   11. Screening for metabolic disorder  Basic Metabolic Panel    HEMOGLOBIN A1C    MICROALBUMIN CREAT RATIO URINE    TSH WITH REFLEX TO FT4   12. Screening for  cardiovascular condition  Lipid Profile   13. Screening for deficiency anemia  CBC WITHOUT DIFFERENTIAL   14. Encounter for vitamin deficiency screening  VITAMIN D,25 HYDROXY   15. Screen for colon cancer  OCCULT BLOOD FECES IMMUNOASSAY   16. Encounter for screening mammogram for malignant neoplasm of breast  MA-SCREENING MAMMO BILAT W/TOMOSYNTHESIS W/CAD   17. Need for hepatitis C screening test  HEP C VIRUS ANTIBODY   18. Encounter for screening for HIV  HIV AG/AB COMBO ASSAY SCREENING   19. Routine screening for STI (sexually transmitted infection)  Chlamydia/GC PCR Urine Or Swab    RPR (SYPHILIS)       Any change or worsening of signs or symptoms, patient encouraged to follow-up or report to emergency room for further evaluation. Patient verbalizes understanding and agrees.    Follow-Up: Return in about 4 weeks (around 10/25/2019) for Pap.   I will reach out to the patient with lab results.    PLEASE NOTE: This dictation was created using voice recognition software. I have made every reasonable attempt to correct obvious errors, but I expect that there are errors of grammar and possibly content that I did not discover before finalizing the note.

## 2019-09-27 NOTE — ASSESSMENT & PLAN NOTE
"Incisonal hernia repair with mesh s/p kimberly rothman in 1999.  She has had discomfort in the region for several years.  She does note that one area feels more \"rubbery\" than usual, but does not specifically identify intestines.    "

## 2019-09-27 NOTE — ASSESSMENT & PLAN NOTE
Reports she vomits after most meals.  She had a harry en y in 1999.  She had a post op incisional hernia.  She reports that she vomits when she feels full after eating.  this has been happening for years.  She reports she thinks this is a combination of a psychological and physiological causes.  She reports she struggles with anxiety when she feels full d/t her hx (she was 500lbs when she had kimberly sx) and she thinks this leads to her vomiting.

## 2019-09-27 NOTE — ASSESSMENT & PLAN NOTE
Uncertain what the problem was, but she was told she has too much fluid in her cornea and would need a cornea specialist.  Unfortunately she never followed up.

## 2019-09-27 NOTE — ASSESSMENT & PLAN NOTE
Patient reports has has had reflux for many years.  She says she is relatively well controlled on omeprazole.  She does have epigastric pain and vomiting with almost every meal.  She was told she has hiatal hernia after a lap rené last year.

## 2019-09-28 ENCOUNTER — HOSPITAL ENCOUNTER (OUTPATIENT)
Dept: LAB | Facility: MEDICAL CENTER | Age: 54
End: 2019-09-28
Attending: NURSE PRACTITIONER
Payer: MEDICAID

## 2019-09-28 DIAGNOSIS — Z13.228 SCREENING FOR METABOLIC DISORDER: ICD-10-CM

## 2019-09-28 DIAGNOSIS — Z11.59 NEED FOR HEPATITIS C SCREENING TEST: ICD-10-CM

## 2019-09-28 DIAGNOSIS — Z13.6 SCREENING FOR CARDIOVASCULAR CONDITION: ICD-10-CM

## 2019-09-28 DIAGNOSIS — Z13.0 SCREENING FOR DEFICIENCY ANEMIA: ICD-10-CM

## 2019-09-28 DIAGNOSIS — Z13.21 ENCOUNTER FOR VITAMIN DEFICIENCY SCREENING: ICD-10-CM

## 2019-09-28 DIAGNOSIS — Z11.4 ENCOUNTER FOR SCREENING FOR HIV: ICD-10-CM

## 2019-09-28 DIAGNOSIS — Z11.3 ROUTINE SCREENING FOR STI (SEXUALLY TRANSMITTED INFECTION): ICD-10-CM

## 2019-09-28 DIAGNOSIS — Z98.84 HX OF GASTRIC BYPASS: ICD-10-CM

## 2019-09-28 LAB
25(OH)D3 SERPL-MCNC: 26 NG/ML (ref 30–100)
ANION GAP SERPL CALC-SCNC: 7 MMOL/L (ref 0–11.9)
BUN SERPL-MCNC: 11 MG/DL (ref 8–22)
CALCIUM SERPL-MCNC: 8.8 MG/DL (ref 8.5–10.5)
CHLORIDE SERPL-SCNC: 113 MMOL/L (ref 96–112)
CHOLEST SERPL-MCNC: 171 MG/DL (ref 100–199)
CO2 SERPL-SCNC: 23 MMOL/L (ref 20–33)
CREAT SERPL-MCNC: 0.92 MG/DL (ref 0.5–1.4)
CREAT UR-MCNC: 105.9 MG/DL
ERYTHROCYTE [DISTWIDTH] IN BLOOD BY AUTOMATED COUNT: 52.5 FL (ref 35.9–50)
EST. AVERAGE GLUCOSE BLD GHB EST-MCNC: 114 MG/DL
GLUCOSE SERPL-MCNC: 86 MG/DL (ref 65–99)
HBA1C MFR BLD: 5.6 % (ref 0–5.6)
HCT VFR BLD AUTO: 40.7 % (ref 37–47)
HDLC SERPL-MCNC: 63 MG/DL
HGB BLD-MCNC: 12.8 G/DL (ref 12–16)
HIV 1+2 AB+HIV1 P24 AG SERPL QL IA: NON REACTIVE
LDLC SERPL CALC-MCNC: 81 MG/DL
MCH RBC QN AUTO: 30.5 PG (ref 27–33)
MCHC RBC AUTO-ENTMCNC: 31.4 G/DL (ref 33.6–35)
MCV RBC AUTO: 97.1 FL (ref 81.4–97.8)
MICROALBUMIN UR-MCNC: <0.7 MG/DL
MICROALBUMIN/CREAT UR: NORMAL MG/G (ref 0–30)
PLATELET # BLD AUTO: 215 K/UL (ref 164–446)
PMV BLD AUTO: 10.7 FL (ref 9–12.9)
POTASSIUM SERPL-SCNC: 4 MMOL/L (ref 3.6–5.5)
RBC # BLD AUTO: 4.19 M/UL (ref 4.2–5.4)
SODIUM SERPL-SCNC: 143 MMOL/L (ref 135–145)
TREPONEMA PALLIDUM IGG+IGM AB [PRESENCE] IN SERUM OR PLASMA BY IMMUNOASSAY: NON REACTIVE
TRIGL SERPL-MCNC: 135 MG/DL (ref 0–149)
TSH SERPL DL<=0.005 MIU/L-ACNC: 1.83 UIU/ML (ref 0.38–5.33)
VIT B12 SERPL-MCNC: 231 PG/ML (ref 211–911)
WBC # BLD AUTO: 6.6 K/UL (ref 4.8–10.8)

## 2019-09-28 PROCEDURE — 36415 COLL VENOUS BLD VENIPUNCTURE: CPT

## 2019-09-28 PROCEDURE — 83036 HEMOGLOBIN GLYCOSYLATED A1C: CPT

## 2019-09-28 PROCEDURE — 80061 LIPID PANEL: CPT

## 2019-09-28 PROCEDURE — 87389 HIV-1 AG W/HIV-1&-2 AB AG IA: CPT

## 2019-09-28 PROCEDURE — 82306 VITAMIN D 25 HYDROXY: CPT

## 2019-09-28 PROCEDURE — 82570 ASSAY OF URINE CREATININE: CPT

## 2019-09-28 PROCEDURE — 87591 N.GONORRHOEAE DNA AMP PROB: CPT

## 2019-09-28 PROCEDURE — 85027 COMPLETE CBC AUTOMATED: CPT

## 2019-09-28 PROCEDURE — 84443 ASSAY THYROID STIM HORMONE: CPT

## 2019-09-28 PROCEDURE — 82043 UR ALBUMIN QUANTITATIVE: CPT

## 2019-09-28 PROCEDURE — 86780 TREPONEMA PALLIDUM: CPT

## 2019-09-28 PROCEDURE — 87491 CHLMYD TRACH DNA AMP PROBE: CPT

## 2019-09-28 PROCEDURE — 82607 VITAMIN B-12: CPT

## 2019-09-28 PROCEDURE — 80048 BASIC METABOLIC PNL TOTAL CA: CPT

## 2019-09-29 LAB
C TRACH DNA SPEC QL NAA+PROBE: NEGATIVE
N GONORRHOEA DNA SPEC QL NAA+PROBE: NEGATIVE
SPECIMEN SOURCE: NORMAL

## 2019-09-30 DIAGNOSIS — Z98.84 HX OF GASTRIC BYPASS: ICD-10-CM

## 2019-09-30 RX ORDER — ERGOCALCIFEROL 1.25 MG/1
50000 CAPSULE ORAL
Qty: 12 CAP | Refills: 1 | Status: SHIPPED | OUTPATIENT
Start: 2019-09-30

## 2019-09-30 RX ORDER — CHOLECALCIFEROL (VITAMIN D3) 125 MCG
500 CAPSULE ORAL DAILY
Qty: 30 TAB | Refills: 3 | Status: SHIPPED | OUTPATIENT
Start: 2019-09-30 | End: 2019-10-29 | Stop reason: SDUPTHER

## 2019-09-30 NOTE — RESULT ENCOUNTER NOTE
Sinai,  I got your lab results.  You are negative for chlamydia, gonorrhea, HIV, and syphilis.  Unfortunately the lab canceled your hepatitis C test for some reason, we will reorder this on your next visit.  You do not have diabetes.  Your vitamin B12 level is barely above normal at 231, normal is considered 211-911.  Your vitamin D level is also low at 26.  I have called in prescription for vitamin B12 500 mcg daily and vitamin D 50,000 units once a week.  You are not anemic, your electrolytes look good, and your kidney function looks good.  Your cholesterol levels look good.  If you have any questions or concerns please reach out or schedule an appointment.  Our next scheduled appointment is 10/29/2019 at 1 PM.  Amanda

## 2019-10-02 ENCOUNTER — TELEPHONE (OUTPATIENT)
Dept: MEDICAL GROUP | Facility: MEDICAL CENTER | Age: 54
End: 2019-10-02

## 2019-10-02 RX ORDER — PANTOPRAZOLE SODIUM 20 MG/1
20 TABLET, DELAYED RELEASE ORAL DAILY
Qty: 30 TAB | Refills: 5 | Status: SHIPPED | OUTPATIENT
Start: 2019-10-02

## 2019-10-02 NOTE — TELEPHONE ENCOUNTER
Insurance has denied omeprazole 20 mg.  PA for omeprazole was denied.    Per insurance they will cover pantoprazole 20 mg Qday.  Notify patient to pay out of pocket or change medication? Please advise .

## 2019-10-17 NOTE — OP THERAPY EVALUATION
"   Outpatient Physical Therapy  INITIAL EVALUATION    [facility] Physical Therapy [facility]  [address]  [address]  [facility] [address]  Phone:  [phone]  Fax:  [phone]    Date of Evaluation: 10/18/2019    Patient: Sinai Lopez  YOB: 1965  MRN: [id number]     Referring Provider: BABS Ball  [address] [address]   Referring Diagnosis Left sided sciatica [M54.32]     Time Calculation  Start time: 0800  Stop time: 0905 Time Calculation (min): 65 minutes       Physical Therapy Occurrence Codes    Date of onset of impairment:  9/27/19   Date physical therapy care plan established or reviewed:  10/18/19   Date physical therapy treatment started:  10/18/19          Chief Complaint: No chief complaint on file.    Visit Diagnoses     ICD-10-CM   1. Left sided sciatica M54.32         Subjective:   History of Present Illness:     Mechanism of injury:  54 year old female with a past medical history of Anxiety, Bipolar 1 disorder (HCC), Depression, and Thyroid disease and past surgical history that includes gastric bypass laparoscopic (1999); abdominal hysterectomy total; and hernia repair, incisional, unilateral.    The patient presents with chief complaint of L glute pain radiating to upper posterior thigh with onset one month when she tripped over something when cleaning. She caught herself by lunging on L leg. Very painful immediately after this step \"it took my breath away\". Had difficulty walking on it, but was able to. Symptoms have improved quite a bit, but is still having difficulty walking, sitting with weight on L side. Sits to the right in the chair during this exam.       Symptoms are provoked by walking, by sitting with WB on L side (leans to R), by sleeping on L side. Symptoms are relieved by \"walking it out\" a bit, massage, layin gflt on her back.       Denies changes to bowel/bladder.   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 in the car)    Progression:  Improving  Social Support:     Lives in:  Apartment (elevator)  Treatments:     None    Activities of Daily Living:     Patient reported ADL status: Independent but slower when needs to lift her leg to dress, etc. Pain getting on/off the toilet,  Patient Goals:     Patient goals for therapy:  Decreased pain, increased motion and improved balance    Other patient goals:  To be able to sit normally, to get rid of this pain      Past Medical History:   Diagnosis Date   • Anxiety    • Bipolar 1 disorder (HCC)    • Depression    • Thyroid disease      Past Surgical History:   Procedure Laterality Date   • GASTRIC BYPASS LAPAROSCOPIC     • ABDOMINAL HYSTERECTOMY TOTAL     • HERNIA REPAIR, INCISIONAL, UNILATERAL      incisional     Social History     Tobacco Use   • Smoking status: Current Every Day Smoker   • Smokeless tobacco: Never Used   Substance Use Topics   • Alcohol use: Not Currently     Comment: sober 7 years, EtOh for 16yrs     Family and Occupational History     Socioeconomic History   • Marital status: Single     Spouse name: Not on file   • Number of children: Not on file   • Years of education: Not on file   • Highest education level: Not on file   Occupational History   • Not on file       Objective     Lumbar Screen  Lumbar range of motion within normal limits.    Neurological Testing     Sensation     Hip   Left Hip   Intact: light touch    Right Hip   Intact: light touch    Reflexes   Left   Patellar (L4): normal (2+)  Achilles (S1): normal (2+)    Right   Patellar (L4): normal (2+)  Achilles (S1): normal (2+)    Myotome testing   Lumbar (left)   All  left lumbar myotomes within normal limits    Lumbar (right)   All right lumbar myotomes within normal limits    Dermatome testing   Lumbar (left)   All left lumbar dermatomes intact    Lumbar (right)   All right lumbar dermatomes intact    Palpation   Left   Tenderness of the gluteus medius, piriformis, proximal biceps femoris, proximal semimembranosus and proximal semitendinosus.     Tenderness     Left Hip   Tenderness in the ischial tuberosity.      Active Range of Motion     Lumbar   All lumbar active range of motion within functional limits  Left Hip   Normal active range of motion  Extension: with pain    Right Hip   Normal active range of motion    Additional Active Range of Motion Details  Lumbar flexion with hips stabilized (absent hip flexion) does not provoke symptoms. Symptoms provoked with HS stretch when attempting toe touch.    Strength:      Left Hip   Planes of Motion   Flexion: 4+  Extension: 4- (with pain)  Abduction: 4  Adduction: 4  External rotation: 4  Internal rotation: 4    Right Hip   Planes of Motion   Flexion: 4+  Extension: 4+  Abduction: 4  Adduction: 4  External rotation: 4  Internal rotation: 4    Tests     Left Hip   Negative ARIANA and FADIR.   SLR: Negative.     Additional Tests Details  Pain with SLR focal to ischial tuberosity L        Therapeutic Exercises (CPT 46912):     1. No charge      Therapeutic Exercise Summary: Access Code: [email or web address]   URL: [email or web address]   Date: 10/18/2019   Prepared by: Jaye Anderson      Exercises  · Supine 90/90 Isometric Hip Extension - 10 reps - 3 sets - 1x daily - 7x weekly  · Standing Hamstring Stretch on Chair - 10 reps - 3 sets - 1x daily - 7x weekly        Therapeutic Treatments and Modalities:     1. E Stim Unattended (CPT 38380), Russian 5/5 L proximal HS w/mh 15' no charge    Time-based treatments/modalities:          Assessment, Response and Plan:   Impairments: abnormal or restricted ROM, impaired functional mobility,  impaired physical strength, lacks appropriate home exercise program and pain with function    Assessment details:  54 year old female presents with 1 month history of L buttock pain with onset when tripping over some laundry and catching herself by lunging with L LE. Exam findings are consistent with proximal HS tendonopathy. Recommend skilled PT to address associated impairments/limitations in order to facilitate return to baseline function and improve QOL.   Barriers to therapy:  None  Prognosis: good    Goals:   Short Term Goals:   Patient is independent/compliant with HEP  Patient is able to sit for >10 minutes with pain </= 2/10        Long Term Goals:    Shaw Anatoliy score improved >/= MCID  Patient is able to sit for 30 minutes without symptoms 90% of the time  Patient is able to walk for 30 minutes without provocation of symptoms  Patient is able to pick object off of floor without provocation of symptoms      Long term goal time span:  6-8 weeks    Plan:   Therapy options:  Physical therapy treatment to continue  Planned therapy interventions:  E Stim Unattended (CPT 14753) and Therapeutic Exercise (CPT 52107)  Frequency:  2x week  Duration in visits:  12  Discussed with:  Patient      Functional Assessment Used  Shaw Anatoliy Low Back Pain and Disability Score: 33.33     Referring provider co-signature:  I have reviewed this plan of care and my co-signature certifies the need for services.  Certification Dates:   From 10/17/19   To 12/13/19    Physician Signature: ________________________________ Date: ______________

## 2019-10-18 ENCOUNTER — PHYSICAL THERAPY (OUTPATIENT)
Dept: PHYSICAL THERAPY | Facility: REHABILITATION | Age: 54
End: 2019-10-18
Attending: NURSE PRACTITIONER
Payer: MEDICAID

## 2019-10-18 DIAGNOSIS — M54.32 LEFT SIDED SCIATICA: ICD-10-CM

## 2019-10-18 PROCEDURE — 97161 PT EVAL LOW COMPLEX 20 MIN: CPT

## 2019-10-18 ASSESSMENT — ENCOUNTER SYMPTOMS
PAIN SCALE AT LOWEST: 5
PAIN SCALE: 5
PAIN TIMING: CONSTANT
PAIN SCALE AT HIGHEST: 8

## 2019-10-23 ENCOUNTER — HOSPITAL ENCOUNTER (OUTPATIENT)
Dept: RADIOLOGY | Facility: MEDICAL CENTER | Age: 54
End: 2019-10-23
Attending: INTERNAL MEDICINE
Payer: MEDICAID

## 2019-10-23 DIAGNOSIS — R13.19 CONSTANT LOW-GRADE DYSPHAGIA: ICD-10-CM

## 2019-10-23 DIAGNOSIS — R11.2 NAUSEA AND VOMITING, INTRACTABILITY OF VOMITING NOT SPECIFIED, UNSPECIFIED VOMITING TYPE: ICD-10-CM

## 2019-10-23 DIAGNOSIS — Z98.84 BARIATRIC SURGERY STATUS: ICD-10-CM

## 2019-10-23 DIAGNOSIS — R19.7 DIARRHEA OF PRESUMED INFECTIOUS ORIGIN: ICD-10-CM

## 2019-10-23 PROCEDURE — 74245 DX-UPPER GI-SMALL BOWEL FOLLOW THRU: CPT

## 2019-10-29 ENCOUNTER — OFFICE VISIT (OUTPATIENT)
Dept: MEDICAL GROUP | Facility: MEDICAL CENTER | Age: 54
End: 2019-10-29
Attending: NURSE PRACTITIONER
Payer: MEDICAID

## 2019-10-29 VITALS
OXYGEN SATURATION: 97 % | TEMPERATURE: 98.5 F | HEART RATE: 85 BPM | RESPIRATION RATE: 16 BRPM | DIASTOLIC BLOOD PRESSURE: 68 MMHG | SYSTOLIC BLOOD PRESSURE: 96 MMHG | HEIGHT: 68 IN | BODY MASS INDEX: 22.08 KG/M2 | WEIGHT: 145.7 LBS

## 2019-10-29 DIAGNOSIS — M54.32 LEFT SIDED SCIATICA: ICD-10-CM

## 2019-10-29 DIAGNOSIS — R53.82 CHRONIC FATIGUE: ICD-10-CM

## 2019-10-29 DIAGNOSIS — Z86.39 HX OF NON ANEMIC VITAMIN B12 DEFICIENCY: ICD-10-CM

## 2019-10-29 DIAGNOSIS — F33.9 EPISODE OF RECURRENT MAJOR DEPRESSIVE DISORDER, UNSPECIFIED DEPRESSION EPISODE SEVERITY (HCC): ICD-10-CM

## 2019-10-29 DIAGNOSIS — Z59.41 FOOD INSECURITY: ICD-10-CM

## 2019-10-29 DIAGNOSIS — Z98.84 HX OF GASTRIC BYPASS: ICD-10-CM

## 2019-10-29 DIAGNOSIS — Z11.59 NEED FOR HEPATITIS C SCREENING TEST: ICD-10-CM

## 2019-10-29 PROBLEM — F32.A DEPRESSION: Status: ACTIVE | Noted: 2019-10-29

## 2019-10-29 PROCEDURE — 99214 OFFICE O/P EST MOD 30 MIN: CPT | Performed by: NURSE PRACTITIONER

## 2019-10-29 RX ORDER — CYANOCOBALAMIN (VITAMIN B-12) 1000 MCG
500 TABLET ORAL DAILY
Qty: 30 TAB | Refills: 11 | Status: SHIPPED | OUTPATIENT
Start: 2019-10-29

## 2019-10-29 RX ORDER — CYANOCOBALAMIN 1000 UG/ML
1000 INJECTION, SOLUTION INTRAMUSCULAR; SUBCUTANEOUS ONCE
Status: COMPLETED | OUTPATIENT
Start: 2019-10-29 | End: 2019-10-29

## 2019-10-29 RX ORDER — LAMOTRIGINE 25 MG/1
TABLET, CHEWABLE ORAL
COMMUNITY

## 2019-10-29 RX ORDER — HYOSCYAMINE SULFATE 0.125 MG
125 TABLET ORAL EVERY 4 HOURS PRN
COMMUNITY

## 2019-10-29 RX ADMIN — CYANOCOBALAMIN 1000 MCG: 1000 INJECTION, SOLUTION INTRAMUSCULAR; SUBCUTANEOUS at 13:43

## 2019-10-29 SDOH — ECONOMIC STABILITY - FOOD INSECURITY: FOOD INSECURITY: Z59.41

## 2019-10-29 ASSESSMENT — ENCOUNTER SYMPTOMS
FEVER: 0
WEIGHT LOSS: 0
SHORTNESS OF BREATH: 0
WHEEZING: 0
HEARTBURN: 1
COUGH: 0
PALPITATIONS: 0
ABDOMINAL PAIN: 0
BLOOD IN STOOL: 0
CHILLS: 0

## 2019-10-29 NOTE — ASSESSMENT & PLAN NOTE
Fatigue for many years.  Hx of B12 deficiency with transfusions and gastric bypass surgery.  Last lab show B12 of 231.  She has had to have iron infusions in the past.

## 2019-10-29 NOTE — ASSESSMENT & PLAN NOTE
She reports that she and her boyfriend have difficulty getting enough food.  She would like food bank supplementation today.

## 2019-10-29 NOTE — ASSESSMENT & PLAN NOTE
Went and saw a psychologist, they sent her to psych.  They psych made some changes to her meds.  She is ramping up on lamotrigine.  She feels much better off of topamax.

## 2019-10-29 NOTE — PROGRESS NOTES
Chief Complaint   Patient presents with   • Follow-Up       Subjective:     HPI:   Sinai Lopez is a 54 y.o. female here to discuss the evaluation and management of:        Depression  Went and saw a psychologist, they sent her to psych.  They psych made some changes to her meds.  She is ramping up on lamotrigine.  She feels much better off of topamax.      Left sided sciatica  Improving with PT.  They think it was not sciatica, but a partially torn hamstring per pt.     Chronic fatigue  Fatigue for many years.  Hx of B12 deficiency with transfusions and gastric bypass surgery.  Last lab show B12 of 231.  She has had to have iron infusions in the past.      Food insecurity  She reports that she and her boyfriend have difficulty getting enough food.  She would like food bank supplementation today.      ROS  Review of Systems   Constitutional: Negative for chills, fever, malaise/fatigue and weight loss.   Respiratory: Negative for cough, shortness of breath and wheezing.    Cardiovascular: Negative for chest pain, palpitations and leg swelling.   Gastrointestinal: Positive for heartburn. Negative for abdominal pain and blood in stool.         Allergies   Allergen Reactions   • Contrast Media With Iodine [Iodine]    • Shellfish Allergy        Current medicines (including changes today)  Current Outpatient Medications   Medication Sig Dispense Refill   • lamoTRIgine 25 MG Chew Tab Take  by mouth.     • hyoscyamine (ANASPAZ, LEVSIN) 0.125 MG tablet Take 125 mcg by mouth every four hours as needed for Cramping.     • cyanocobalamin 1000 MCG Tab Take 500 mcg by mouth every day. 30 Tab 11   • pantoprazole (PROTONIX) 20 MG tablet Take 1 Tab by mouth every day. 30 Tab 5   • ergocalciferol (DRISDOL) 31217 UNIT capsule Take 1 Cap by mouth every 7 days. 12 Cap 1   • levothyroxine (SYNTHROID) 25 MCG Tab Take 1 Tab by mouth Every morning on an empty stomach. 30 Tab 1   • folic acid (FOLVITE) 1 MG Tab Take 1 Tab by  "mouth every day. 30 Tab 11   • docusate sodium (COLACE) 100 MG Cap Take 1 Cap by mouth 2 times a day. 60 Cap 11   • atorvastatin (LIPITOR) 10 MG Tab Take 1 Tab by mouth every day. 30 Tab 5     No current facility-administered medications for this visit.        Social History     Tobacco Use   • Smoking status: Current Every Day Smoker     Packs/day: 0.50     Types: Cigarettes   • Smokeless tobacco: Never Used   Substance Use Topics   • Alcohol use: Not Currently     Comment: sober 7 years, EtOh for 16yrs   • Drug use: Not Currently     Comment: sober 7 years, 30 +year use       Patient Active Problem List    Diagnosis Date Noted   • Depression 10/29/2019   • Hx of non anemic vitamin B12 deficiency 10/29/2019   • Chronic fatigue 10/29/2019   • Food insecurity 10/29/2019   • Encounter to establish care 09/27/2019   • Non-intractable cyclical vomiting without nausea 09/27/2019   • Incisional hernia, without obstruction or gangrene 09/27/2019   • Other specified hypothyroidism 09/27/2019   • Other hyperlipidemia 09/27/2019   • Left sided sciatica 09/27/2019   • Gastroesophageal reflux disease 09/27/2019   • Disorder of cornea 09/27/2019   • Hx of gastric bypass 09/27/2019       No family history on file.       Objective:     BP (!) 96/68 (BP Location: Left arm, Patient Position: Sitting, BP Cuff Size: Adult)   Pulse 85   Temp 36.9 °C (98.5 °F) (Temporal)   Resp 16   Ht 1.727 m (5' 8\")   Wt 66.1 kg (145 lb 11.2 oz)   SpO2 97%  Body mass index is 22.15 kg/m².    Physical Exam:  Physical Exam   Constitutional: She is oriented to person, place, and time and well-developed, well-nourished, and in no distress. No distress.   HENT:   Head: Normocephalic.   Right Ear: Tympanic membrane and external ear normal.   Left Ear: Tympanic membrane and external ear normal.   Eyes: Pupils are equal, round, and reactive to light. Conjunctivae and EOM are normal.   Neck: Normal range of motion. Neck supple. No tracheal deviation " present.   Cardiovascular: Normal rate, regular rhythm, normal heart sounds and intact distal pulses.   Pulmonary/Chest: Effort normal and breath sounds normal.   Abdominal: Soft. Bowel sounds are normal.   Musculoskeletal: Normal range of motion.   Lymphadenopathy:        Head (right side): No preauricular adenopathy present.        Head (left side): No preauricular adenopathy present.     She has no cervical adenopathy.   Neurological: She is alert and oriented to person, place, and time. She has normal sensation, normal strength and intact cranial nerves. Gait normal.   Skin: Skin is warm and dry.   Psychiatric: Affect and judgment normal.       Assessment and Plan:     The following treatment plan was discussed:    1. Episode of recurrent major depressive disorder, unspecified depression episode severity (HCC)   continue plan of care per psychology and psychiatry.  The patient reports that her mood is improving on her new medication.   2. Hx of gastric bypass  cyanocobalamin 1000 MCG Tab  Cyanocobalamin 1000 mcg injection administered in office today.  -Considering her history of gastric bypass and B12 anemia I will increase her daily dose of oral B12 and provide a one-time injection today.   3. Left sided sciatica   continue plan of care per physical therapy.  The patient reports that her pain is improving.   4. Hx of non anemic vitamin B12 deficiency  -see above.   5. Need for hepatitis C screening test  HEP C VIRUS ANTIBODY   6. Chronic fatigue  -we discussed that this is likely multifactorial  Considering her depression, hx of vitamin deficiency, and pain.  Patient agreeable.  - Cyanocobalamin 1000 mcg injection administered in office today.   7. Food insecurity   food bank bag provided for her and her significant other today.       Any change or worsening of signs or symptoms, patient encouraged to follow-up or report to emergency room for further evaluation. Patient verbalizes understanding and  agrees.    Follow-Up: Return in about 4 weeks (around 11/26/2019) for Pap.      PLEASE NOTE: This dictation was created using voice recognition software. I have made every reasonable attempt to correct obvious errors, but I expect that there are errors of grammar and possibly content that I did not discover before finalizing the note.

## 2019-10-30 ENCOUNTER — HOSPITAL ENCOUNTER (OUTPATIENT)
Dept: LAB | Facility: MEDICAL CENTER | Age: 54
End: 2019-10-30
Attending: NURSE PRACTITIONER
Payer: MEDICAID

## 2019-10-30 DIAGNOSIS — Z11.59 NEED FOR HEPATITIS C SCREENING TEST: ICD-10-CM

## 2019-10-30 LAB — HCV AB SER QL: NEGATIVE

## 2019-10-30 PROCEDURE — 86803 HEPATITIS C AB TEST: CPT

## 2019-10-30 PROCEDURE — 36415 COLL VENOUS BLD VENIPUNCTURE: CPT

## 2019-11-05 NOTE — RESULT ENCOUNTER NOTE
Lasha Rawls,     I got your lab results, you are NEGATIVE for hepatitis C. Please reach out if you have questions or concerns.  Amanda

## 2019-11-08 ENCOUNTER — APPOINTMENT (OUTPATIENT)
Dept: PHYSICAL THERAPY | Facility: REHABILITATION | Age: 54
End: 2019-11-08
Attending: NURSE PRACTITIONER
Payer: MEDICAID

## 2019-11-13 ENCOUNTER — PHYSICAL THERAPY (OUTPATIENT)
Dept: PHYSICAL THERAPY | Facility: REHABILITATION | Age: 54
End: 2019-11-13
Attending: NURSE PRACTITIONER
Payer: MEDICAID

## 2019-11-13 DIAGNOSIS — S76.312D STRAIN OF LEFT HAMSTRING, SUBSEQUENT ENCOUNTER: ICD-10-CM

## 2019-11-13 DIAGNOSIS — M54.32 LEFT SIDED SCIATICA: ICD-10-CM

## 2019-11-13 PROCEDURE — 97110 THERAPEUTIC EXERCISES: CPT

## 2019-11-13 PROCEDURE — 97014 ELECTRIC STIMULATION THERAPY: CPT

## 2019-11-13 NOTE — OP THERAPY DAILY TREATMENT
Outpatient Physical Therapy  DAILY TREATMENT     [facility] Physical [address]  [address]  [place] [address]  Phone:  [phone]  Fax:  [phone]    Date: 11/13/2019    Patient: Sinai Lopez  YOB: 1965  MRN: [id number]     Time Calculation  Start time: 1535(patient arrived late)  Stop time: 1620 Time Calculation (min): 45 minutes       Chief Complaint: Hip Problem    Visit #: 2    SUBJECTIVE:  Feels 20% better. Sx still flare when sitting >5 minutes. Has been doing the exercises.     OBJECTIVE:  Current objective measures:           Therapeutic Exercises (CPT 23257):     1. Nustepper L3 sitting on airex pad for additional cushioning, 5 min    2. Ball roll AAROM hip/knee flexion, x 20    3. Bridging , x 10    4. SKTC, 30 sec. x 2    5. Supine HS Stretch, 30 sec. x 2    6. Seated glute sets, x 10    7. Standing HS curls AROM, x 15    8. Standing HS stretch, 30 sec. x 2      Therapeutic Exercise Summary: Access Code: [email or web address]   URL: [email or web address]         Therapeutic Treatments and Modalities:     1. E Stim Unattended (CPT 99765), Russian 5/5 L proximal HS w/mhp 15'    Time-based treatments/modalities:  Therapeutic exercise minutes (CPT 44448): 24 minutes           ASSESSMENT:   Response to treatment: Sx reduction with light load and stretching.     PLAN/RECOMMENDATIONS:   Plan for treatment: therapy treatment to continue next visit.  Planned interventions for next visit: continue with current treatment. Bridging. Shuttle. HS curls. IASTM.

## 2019-11-15 ENCOUNTER — PHYSICAL THERAPY (OUTPATIENT)
Dept: PHYSICAL THERAPY | Facility: REHABILITATION | Age: 54
End: 2019-11-15
Attending: NURSE PRACTITIONER
Payer: MEDICAID

## 2019-11-15 DIAGNOSIS — S76.312D STRAIN OF LEFT HAMSTRING, SUBSEQUENT ENCOUNTER: ICD-10-CM

## 2019-11-15 DIAGNOSIS — M54.32 LEFT SIDED SCIATICA: ICD-10-CM

## 2019-11-15 PROCEDURE — 97014 ELECTRIC STIMULATION THERAPY: CPT

## 2019-11-15 PROCEDURE — 97110 THERAPEUTIC EXERCISES: CPT

## 2019-11-15 NOTE — OP THERAPY DAILY TREATMENT
Outpatient Physical Therapy  DAILY TREATMENT     [facility] Physical Therapy [address]  [address]  [place] [address]  Phone:  [phone]  Fax:  [phone]    Date: 11/15/2019    Patient: Sinai Lopez  YOB: 1965  MRN: [id number]     Time Calculation  Start time: 1300  Stop time: 1332 Time Calculation (min): 32 minutes       Chief Complaint: Hip Problem    Visit #: 3    SUBJECTIVE:  I have to  my grandkids at 1:30pm. Can I just do the electricity? My leg is feeling a lot better.      OBJECTIVE:  Current objective measures:           Therapeutic Exercises (CPT 18444):     1. Elliptical L6 3 min alt    2. Shuttle 7c. BLE 15 x 2    3. HS curls BLE on theraball, x 20    4. Bridging on theraball, x 10    5. Wall squats, 10 x 2      Therapeutic Exercise Summary: Access Code: [email or web address]   URL: [email or web address]         Therapeutic Treatments and Modalities:     1. E Stim Unattended (CPT 54084), Russian continuous L proximal HS w/mhp 10'    Time-based treatments/modalities:  Therapeutic exercise minutes (CPT 75303): 15 minutes         ASSESSMENT:   Response to treatment: Symptoms reducing with progression of loading activities. Short session today as patient had to leave to  her grandchildren at school.     PLAN/RECOMMENDATIONS:   Plan for treatment: therapy treatment to continue next visit.  Planned interventions for next visit: continue with current treatment.

## 2019-11-20 ENCOUNTER — APPOINTMENT (OUTPATIENT)
Dept: PHYSICAL THERAPY | Facility: REHABILITATION | Age: 54
End: 2019-11-20
Attending: NURSE PRACTITIONER
Payer: MEDICAID

## 2019-11-22 ENCOUNTER — APPOINTMENT (OUTPATIENT)
Dept: PHYSICAL THERAPY | Facility: REHABILITATION | Age: 54
End: 2019-11-22
Attending: NURSE PRACTITIONER
Payer: MEDICAID

## 2019-11-27 ENCOUNTER — TELEPHONE (OUTPATIENT)
Dept: MEDICAL GROUP | Facility: MEDICAL CENTER | Age: 54
End: 2019-11-27

## 2019-11-27 NOTE — TELEPHONE ENCOUNTER
FINAL PRIOR AUTHORIZATION STATUS:    1.  Name of Medication & Dose: Omeprazole     2. Prior Auth Status: Denied.  Reason: not covered via formulary. denial scanned into media    3. Action Taken: Pharmacy Notified: yes Patient Notified: yes

## 2020-01-08 ENCOUNTER — OFFICE VISIT (OUTPATIENT)
Dept: URGENT CARE | Facility: CLINIC | Age: 55
End: 2020-01-08
Payer: MEDICAID

## 2020-01-08 ENCOUNTER — APPOINTMENT (OUTPATIENT)
Dept: RADIOLOGY | Facility: IMAGING CENTER | Age: 55
End: 2020-01-08
Attending: PHYSICIAN ASSISTANT
Payer: MEDICAID

## 2020-01-08 VITALS
DIASTOLIC BLOOD PRESSURE: 78 MMHG | SYSTOLIC BLOOD PRESSURE: 110 MMHG | WEIGHT: 147 LBS | BODY MASS INDEX: 22.35 KG/M2 | OXYGEN SATURATION: 98 % | HEART RATE: 78 BPM | RESPIRATION RATE: 18 BRPM | TEMPERATURE: 98 F

## 2020-01-08 DIAGNOSIS — R10.10 PAIN OF UPPER ABDOMEN: ICD-10-CM

## 2020-01-08 DIAGNOSIS — K59.00 CONSTIPATION, UNSPECIFIED CONSTIPATION TYPE: ICD-10-CM

## 2020-01-08 DIAGNOSIS — Z98.84 HX OF GASTRIC BYPASS: ICD-10-CM

## 2020-01-08 PROCEDURE — 74019 RADEX ABDOMEN 2 VIEWS: CPT | Mod: TC | Performed by: FAMILY MEDICINE

## 2020-01-08 PROCEDURE — 99204 OFFICE O/P NEW MOD 45 MIN: CPT | Performed by: PHYSICIAN ASSISTANT

## 2020-01-08 RX ORDER — LACTULOSE 10 G/15ML
10 SOLUTION ORAL 2 TIMES DAILY
Qty: 1 BOTTLE | Refills: 0 | Status: SHIPPED | OUTPATIENT
Start: 2020-01-08 | End: 2020-01-13

## 2020-01-08 ASSESSMENT — ENCOUNTER SYMPTOMS
FEVER: 0
ABDOMINAL PAIN: 1
NAUSEA: 0
BLOOD IN STOOL: 0
BLOATING: 1
VOMITING: 1
RECTAL PAIN: 0
FLANK PAIN: 0
COUGH: 0
CONSTIPATION: 1
DIARRHEA: 0
FALLS: 0
CHILLS: 0
EYE REDNESS: 0

## 2020-01-08 NOTE — PROGRESS NOTES
"Subjective:      Sinai Lopez is a 54 y.o. female who presents with Constipation (upper stomach pain-x1 day (last bowel mvmt about 1 wk ago))            Patient is a 54-year-old female who presents to urgent care with upper abdominal pain worse the last day.  Patient reports that she believes she has not had a bowel movement in 1 week which is fairly unusual for the patient.  She does have chronic constipation however \"not like this \".  She is currently on Colace consistently.  She does report prior history of same in the past of which she recently had an upper GI study-of which this did not reveal a hiatal hernia however she is status post incisional hernia, gastric bypass along with hysterectomy.  Patient denies alcohol usage or history of pancreatitis.  She also denies any urinary symptoms.  She does report one episode of vomiting today.  She does mention that she is \"having a lot of burping but denies much gas.  She denies history of small bowel obstruction.    Constipation   This is a new problem. The current episode started in the past 7 days. The problem is unchanged. Her stool frequency is 1 time per week or less. The patient is not on a high fiber diet. She does not exercise regularly. There has not been adequate water intake. Associated symptoms include abdominal pain, bloating and vomiting. Pertinent negatives include no diarrhea, fever, melena, nausea or rectal pain. She has tried stool softeners for the symptoms. The treatment provided no relief. Her past medical history is significant for abdominal surgery.       Review of Systems   Constitutional: Negative for chills, fever and malaise/fatigue.   Eyes: Negative for redness.   Respiratory: Negative for cough.    Gastrointestinal: Positive for abdominal pain, bloating, constipation and vomiting. Negative for blood in stool, diarrhea, melena, nausea and rectal pain.   Genitourinary: Negative for dysuria, flank pain, frequency, hematuria and " urgency.   Musculoskeletal: Negative for falls and joint pain.   All other systems reviewed and are negative.         Objective:     /78 (BP Location: Left arm)   Pulse 78   Temp 36.7 °C (98 °F) (Temporal)   Resp 18   Wt 66.7 kg (147 lb)   SpO2 98%   BMI 22.35 kg/m²    PMH:  has a past medical history of Anxiety, Bipolar 1 disorder (HCC), Depression, and Thyroid disease.  MEDS: Reviewed .   ALLERGIES:   Allergies   Allergen Reactions   • Contrast Media With Iodine [Iodine]    • Shellfish Allergy      SURGHX:   Past Surgical History:   Procedure Laterality Date   • GASTRIC BYPASS LAPAROSCOPIC  1999   • ABDOMINAL HYSTERECTOMY TOTAL     • HERNIA REPAIR, INCISIONAL, UNILATERAL      incisional     SOCHX:  reports that she has been smoking cigarettes. She has been smoking about 0.50 packs per day. She has never used smokeless tobacco. She reports previous alcohol use. She reports previous drug use.  FH: Family history was reviewed, no pertinent findings to report    Physical Exam  Vitals signs reviewed.   Constitutional:       General: She is not in acute distress.     Appearance: She is well-developed.   HENT:      Head: Normocephalic and atraumatic.      Right Ear: External ear normal.      Left Ear: External ear normal.      Mouth/Throat:      Pharynx: No oropharyngeal exudate.   Eyes:      Conjunctiva/sclera: Conjunctivae normal.      Pupils: Pupils are equal, round, and reactive to light.   Neck:      Musculoskeletal: Normal range of motion and neck supple.      Trachea: No tracheal deviation.   Cardiovascular:      Rate and Rhythm: Normal rate and regular rhythm.      Heart sounds: No murmur.   Pulmonary:      Effort: Pulmonary effort is normal. No respiratory distress.      Breath sounds: Normal breath sounds.   Abdominal:      Tenderness: There is tenderness in the epigastric area and left upper quadrant.          Comments: Diffuse epigastric and left upper quadrant abdominal tenderness-hypoactive  bowel sounds.   Musculoskeletal: Normal range of motion.   Skin:     General: Skin is warm.      Findings: No rash.   Neurological:      Mental Status: She is alert and oriented to person, place, and time.      Coordination: Coordination normal.   Psychiatric:         Behavior: Behavior normal.         Thought Content: Thought content normal.         Judgment: Judgment normal.            XR abd:   1.  Increased right-sided colonic stool.  2.  Nonspecific mildly gaseous distended loops of small and large bowel.  3.  Prior gastric bypass surgery.    Assessment/Plan:     1. Constipation, unspecified constipation type  - [id number] VIEWS; Future  - lactulose 10 GM/15ML Solution; Take 15 mL by mouth 2 times a day for 5 days.  Dispense: 1 Bottle; Refill: 0    2. Hx of gastric bypass  - [id number]-3 VIEWS; Future    3. Pain of upper abdomen  - [id number]-2 VIEWS; Future    Patient only has been utilizing her stool softener has not been utilizing her prior fiber supplement nor her teas that she uses usually.  She is requesting something to help with constipation.  Will write for the above however if patient with minimal improvement of abdominal pain or bowel movements-patient must return for CT at that time concerning for obstruction.  Patient is very agreeable and understands and strict precautions were given.  Patient given precautionary s/sx that mandate immediate follow up and evaluation in the ED. Advised of risks of not doing so.    DDX, Supportive care, and indications for immediate follow-up discussed with patient.    Instructed to return to clinic or nearest emergency department if we are not available for any change in condition, further concerns, or worsening of symptoms.    The patient demonstrated a good understanding and agreed with the treatment plan.  Please note that this dictation was created using voice recognition software. I have made every reasonable attempt to correct obvious errors, but I expect  that there are errors of grammar and possibly content that I did not discover before finalizing the note.

## 2020-01-22 ENCOUNTER — OFFICE VISIT (OUTPATIENT)
Dept: MEDICAL GROUP | Facility: MEDICAL CENTER | Age: 55
End: 2020-01-22
Attending: INTERNAL MEDICINE
Payer: MEDICAID

## 2020-01-22 VITALS
BODY MASS INDEX: 21.07 KG/M2 | RESPIRATION RATE: 16 BRPM | TEMPERATURE: 100.1 F | HEART RATE: 84 BPM | HEIGHT: 68 IN | OXYGEN SATURATION: 97 % | WEIGHT: 139 LBS | SYSTOLIC BLOOD PRESSURE: 122 MMHG | DIASTOLIC BLOOD PRESSURE: 80 MMHG

## 2020-01-22 DIAGNOSIS — R05.8 POST-VIRAL COUGH SYNDROME: ICD-10-CM
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
## 2020-01-22 PROCEDURE — 99214 OFFICE O/P EST MOD 30 MIN: CPT | Performed by: INTERNAL MEDICINE

## 2020-01-22 PROCEDURE — 99212 OFFICE O/P EST SF 10 MIN: CPT | Performed by: INTERNAL MEDICINE

## 2020-01-22 RX ORDER — ALBUTEROL SULFATE 90 UG/1
2 AEROSOL, METERED RESPIRATORY (INHALATION) EVERY 6 HOURS PRN
Qty: 8.5 G | Refills: 2 | Status: SHIPPED | OUTPATIENT
Start: 2020-01-22

## 2020-01-22 RX ORDER — BENZONATATE 100 MG/1
100 CAPSULE ORAL 3 TIMES DAILY PRN
Qty: 30 CAP | Refills: 0 | Status: SHIPPED | OUTPATIENT
Start: 2020-01-22

## 2020-01-22 RX ORDER — GUAIFENESIN AND DEXTROMETHORPHAN HYDROBROMIDE 100; 10 MG/5ML; MG/5ML
10 SOLUTION ORAL EVERY 6 HOURS PRN
Qty: 840 ML | Refills: 0 | Status: SHIPPED | OUTPATIENT
Start: 2020-01-22

## 2020-01-22 NOTE — ASSESSMENT & PLAN NOTE
She reports that her current motel has bedbugs.  She has seen them.  She has reported this and states that they have fumigated for the bedbugs however she continues to get recurrent bites.  She states that they are itching and she is requesting a cream to help with this.  She has been using Benadryl cream and she takes a Benadryl tablet every night as well.

## 2020-01-22 NOTE — PROGRESS NOTES
Subjective:   Sinai Lopez is a 54 y.o. female here today for persistent cough, bedbug bites    Post-viral cough syndrome  She reports that just before Thanksgiving, she got sick with an upper respiratory infection.  She feels like she has recovered for the most part however she continues to have a persistent cough.  She reports that it has gotten significantly better however it is keeping her from sleeping at night.  She was taking some over-the-counter [place] DM which worked very well however she ran out and cannot afford a new bottle.  She denies fevers and chills although she reports having some hot flashes at night and is concerned she may be starting menopause.  She denies sore throat, ear pain, runny nose.  She does endorse some nasal congestion.  She denies increased shortness of breath.  In the past, she has also used an albuterol inhaler when she has been sick and she is requesting a refill of this as well.     Insect bites  She reports that her current motel has bedbugs.  She has seen them.  She has reported this and states that they have fumigated for the bedbugs however she continues to get recurrent bites.  She states that they are itching and she is requesting a cream to help with this.  She has been using Benadryl cream and she takes a Benadryl tablet every night as well.       Current medicines (including changes today)  Current Outpatient Medications   Medication Sig Dispense Refill   • Dextromethorphan-guaiFENesin (TUSSIN DM)  MG/5ML Syrup Take 10 mL by mouth every 6 hours as needed. 840 mL 0   • benzonatate (TESSALON) 100 MG Cap Take 1 Cap by mouth 3 times a day as needed for Cough. 30 Cap 0   • albuterol 108 (90 Base) MCG/ACT Aero Soln inhalation aerosol Inhale 2 Puffs by mouth every 6 hours as needed for Shortness of Breath. 8.5 g 2   • hydrocortisone 2.5 % Cream topical cream Apply thin layer to itchy areas on arms twice daily as needed 30 g 0   • levothyroxine (SYNTHROID) 25  MCG Tab TAKE 1 TABLET ORALLY EVERY MORNING ON AN EMPTY STOMACH 90 Tab 2   • lamoTRIgine 25 MG Chew Tab Take  by mouth.     • cyanocobalamin 1000 MCG Tab Take 500 mcg by mouth every day. 30 Tab 11   • pantoprazole (PROTONIX) 20 MG tablet Take 1 Tab by mouth every day. 30 Tab 5   • ergocalciferol (DRISDOL) 93620 UNIT capsule Take 1 Cap by mouth every 7 days. 12 Cap 1   • folic acid (FOLVITE) 1 MG Tab Take 1 Tab by mouth every day. 30 Tab 11   • docusate sodium (COLACE) 100 MG Cap Take 1 Cap by mouth 2 times a day. 60 Cap 11   • atorvastatin (LIPITOR) 10 MG Tab Take 1 Tab by mouth every day. 30 Tab 5   • hyoscyamine (ANASPAZ, LEVSIN) 0.125 MG tablet Take 125 mcg by mouth every four hours as needed for Cramping.       No current facility-administered medications for this visit.      She  has a past medical history of Anxiety, Bipolar 1 disorder (HCC), Depression, and Thyroid disease.    ROS   Denies chest pain, shortness of breath  As above in HPI     Objective:     Vitals:    01/22/20 1413   BP: 122/80   Pulse: 84   Resp: 16   Temp: 37.8 °C (100.1 °F)   SpO2: 97%     Body mass index is 21.14 kg/m².   Physical Exam:  Constitutional: Alert, no distress.  Skin: Warm, dry, good turgor, numerous scabbed and excoriated lesions on arms  Eye: Equal, round and reactive, conjunctiva clear, lids normal.  ENMT: Lips without lesions, dentures, oropharynx clear, TM's clear bilaterally  Neck: Trachea midline, no masses, no thyromegaly. No cervical or supraclavicular lymphadenopathy  Respiratory: Unlabored respiratory effort, lungs clear to auscultation, no wheezes, no ronchi.  Cardiovascular: Regular rate and rhythm, no murmurs appreciated      Assessment and Plan:   The following treatment plan was discussed    1. Post-viral cough syndrome  Overall, her infectious symptoms has resolved but she has been unable to stop coughing.  I have refilled her [place] DM although we discussed this may not be covered by insurance.  Prescription  for Tessalon Perles was also given as well as her albuterol inhaler.  She will follow-up with us if no improvement of symptoms with current treatment.  - Dextromethorphan-guaiFENesin (TUSSIN DM)  MG/5ML Syrup; Take 10 mL by mouth every 6 hours as needed.  Dispense: 840 mL; Refill: 0  - benzonatate (TESSALON) 100 MG Cap; Take 1 Cap by mouth 3 times a day as needed for Cough.  Dispense: 30 Cap; Refill: 0  - albuterol 108 (90 Base) MCG/ACT Aero Soln inhalation aerosol; Inhale 2 Puffs by mouth every 6 hours as needed for Shortness of Breath.  Dispense: 8.5 g; Refill: 2    2. Insect bite, unspecified site, initial encounter  She can continue to use the Benadryl cream and oral Benadryl in conjunction to the hydrocortisone.  - hydrocortisone 2.5 % Cream topical cream; Apply thin layer to itchy areas on arms twice daily as needed  Dispense: 30 g; Refill: 0        Followup: Return if symptoms worsen or fail to improve.

## 2020-01-22 NOTE — ASSESSMENT & PLAN NOTE
She reports that just before Thanksgiving, she got sick with an upper respiratory infection.  She feels like she has recovered for the most part however she continues to have a persistent cough.  She reports that it has gotten significantly better however it is keeping her from sleeping at night.  She was taking some over-the-counter [place] DM which worked very well however she ran out and cannot afford a new bottle.  She denies fevers and chills although she reports having some hot flashes at night and is concerned she may be starting menopause.  She denies sore throat, ear pain, runny nose.  She does endorse some nasal congestion.  She denies increased shortness of breath.  In the past, she has also used an albuterol inhaler when she has been sick and she is requesting a refill of this as well.

## 2020-01-24 ENCOUNTER — PATIENT MESSAGE (OUTPATIENT)
Dept: MEDICAL GROUP | Facility: MEDICAL CENTER | Age: 55
End: 2020-01-24

## 2020-01-24 RX ORDER — HYDROXYZINE HYDROCHLORIDE 25 MG/1
25 TABLET, FILM COATED ORAL 3 TIMES DAILY PRN
Qty: 30 TAB | Refills: 0 | Status: SHIPPED | OUTPATIENT
Start: 2020-01-24 | End: 2020-02-11 | Stop reason: SDUPTHER

## 2020-01-29 ENCOUNTER — PATIENT MESSAGE (OUTPATIENT)
Dept: MEDICAL GROUP | Facility: MEDICAL CENTER | Age: 55
End: 2020-01-29

## 2020-01-29 RX ORDER — DOXYCYCLINE HYCLATE 100 MG/1
100 CAPSULE ORAL 2 TIMES DAILY
Qty: 14 EACH | Refills: 0 | Status: SHIPPED | OUTPATIENT
Start: 2020-01-29 | End: 2020-02-05

## 2020-02-04 ENCOUNTER — OFFICE VISIT (OUTPATIENT)
Dept: MEDICAL GROUP | Facility: MEDICAL CENTER | Age: 55
End: 2020-02-04
Attending: NURSE PRACTITIONER
Payer: MEDICAID

## 2020-02-04 VITALS
SYSTOLIC BLOOD PRESSURE: 110 MMHG | BODY MASS INDEX: 21.76 KG/M2 | RESPIRATION RATE: 16 BRPM | DIASTOLIC BLOOD PRESSURE: 60 MMHG | OXYGEN SATURATION: 98 % | HEIGHT: 68 IN | WEIGHT: 143.6 LBS | HEART RATE: 84 BPM | TEMPERATURE: 98.3 F

## 2020-02-04 DIAGNOSIS — N95.2 ATROPHIC VAGINITIS: ICD-10-CM

## 2020-02-04 DIAGNOSIS — R45.86 MOOD SWINGS: ICD-10-CM

## 2020-02-04 DIAGNOSIS — N39.3 STRESS INCONTINENCE: ICD-10-CM

## 2020-02-04 DIAGNOSIS — Z12.4 SCREENING FOR CERVICAL CANCER: ICD-10-CM

## 2020-02-04 PROCEDURE — 99213 OFFICE O/P EST LOW 20 MIN: CPT | Performed by: NURSE PRACTITIONER

## 2020-02-04 PROCEDURE — 99214 OFFICE O/P EST MOD 30 MIN: CPT | Performed by: NURSE PRACTITIONER

## 2020-02-04 RX ORDER — LAMOTRIGINE 200 MG/1
TABLET ORAL
COMMUNITY
Start: 2019-12-24

## 2020-02-04 RX ORDER — LAMOTRIGINE 100 MG/1
TABLET, ORALLY DISINTEGRATING ORAL
COMMUNITY
Start: 2019-12-06

## 2020-02-04 RX ORDER — ESTRADIOL 10 UG/1
10 INSERT VAGINAL DAILY
Qty: 14 TAB | Refills: 0 | Status: SHIPPED
Start: 2020-02-04 | End: 2020-02-13

## 2020-02-04 RX ORDER — ESTRADIOL 10 UG/1
10 INSERT VAGINAL
Qty: 8 TAB | Refills: 3 | Status: SHIPPED
Start: 2020-02-19 | End: 2020-02-13

## 2020-02-04 RX ORDER — CHOLECALCIFEROL (VITAMIN D3) 125 MCG
CAPSULE ORAL
COMMUNITY
Start: 2019-12-16

## 2020-02-05 NOTE — ASSESSMENT & PLAN NOTE
Present for the last couple months.  She reports she will cry for no reason.  She reports she is having night sweats.  She is forgetful and irritable.  She say her psych yesterday and they increased her lomotrigine.  She is unable to orgasm.  She is concerned that she is going through menopause.

## 2020-02-05 NOTE — ASSESSMENT & PLAN NOTE
Patient reports that over the last several months her vaginal tissue has become very dry.  She reports she is not interested in intercourse and is concerned about the impact this is having on her relationship.  She denies painful intercourse.    Patient is interested in hormone replacement therapy for atrophic vaginitis.    Contraindications:  History of breast cancer: no  History of DVT/clotting disorder: no  Cardiovascular disease: no  Stroke/TIA: no  Unexplained vaginal bleeding: no  High risk endometrial cancer: no  Active liver disease: no    Avoid HRT in:  Elevated triglycerides: no  Active gallbladder disease: no  Thrombophilia without DVT: no    Does the patient have a uterus: no

## 2020-02-05 NOTE — PROGRESS NOTES
cc: well exam    Subjective:     Sinai Lopez is a 54 y.o. female presents for a routine preventive health exam.    Ob-Gyn/ History:    /Para:  2/2  Last Pap Smear:  Many years. yes history of abnormal pap smears- cannot remember. She knows she had top repeat pap in 6 months.   Gyn Surgery:  hysterectomy.  Current Contraceptive Method:  none. Yes currently sexually active.  Last menstrual period:  20 yrs.    Post-menopausal bleeding: none  Urinary incontinence: stress incontinence  Folate intake: N/A     GYN/:  Abnormal bleeding:  Denies  Abnormal discharge: Denies  Painful intercourse: Denies      Mood swings  Present for the last couple months.  She reports she will cry for no reason.  She reports she is having night sweats.  She is forgetful and irritable.  She say her psych yesterday and they increased her lomotrigine.  She is unable to orgasm.  She is concerned that she is going through menopause.      Screening for cervical cancer  Upon exam today the patient does not have a cervix, Pap no longer required.    Atrophic vaginitis  Patient reports that over the last several months her vaginal tissue has become very dry.  She reports she is not interested in intercourse and is concerned about the impact this is having on her relationship.  She denies painful intercourse.    Patient is interested in hormone replacement therapy for atrophic vaginitis.    Contraindications:  History of breast cancer: no  History of DVT/clotting disorder: no  Cardiovascular disease: no  Stroke/TIA: no  Unexplained vaginal bleeding: no  High risk endometrial cancer: no  Active liver disease: no    Avoid HRT in:  Elevated triglycerides: no  Active gallbladder disease: no  Thrombophilia without DVT: no    Does the patient have a uterus: no          ROS      Health Maintenance    Cholesterol Screenin/19  Diabetes Screenin/19  Aspirin Use: no    Diet: could be healthier   Exercise: no   Substance Abuse: NO    Safe in relationship.   Seat belts, bike helmet, gun safety discussed.  Sun protection used.    Cancer screening  Colorectal Cancer Screenin    Lung Cancer Screening: no    Cervical Cancer Screening: today    Breast Cancer Screening: ordered mammo       Infectious disease screening  --STI Screenin/19  --HIV Screenin/19   --Hepatitis C Screening: neg 10/19       Physical Exam   Constitutional: She is oriented to person, place, and time and well-developed, well-nourished, and in no distress. No distress.   HENT:   Head: Normocephalic.   Right Ear: Tympanic membrane and external ear normal.   Left Ear: Tympanic membrane and external ear normal.   Eyes: Pupils are equal, round, and reactive to light. Conjunctivae and EOM are normal.   Neck: Normal range of motion. Neck supple. No tracheal deviation present.   Cardiovascular: Normal rate, regular rhythm, normal heart sounds and intact distal pulses.   Pulmonary/Chest: Effort normal and breath sounds normal.   Abdominal: Soft. Bowel sounds are normal.   Genitourinary:    Vulva, right adnexa and left adnexa normal.     Musculoskeletal: Normal range of motion.   Lymphadenopathy:        Head (right side): No preauricular adenopathy present.        Head (left side): No preauricular adenopathy present.     She has no cervical adenopathy.   Neurological: She is alert and oriented to person, place, and time. She has normal sensation, normal strength and intact cranial nerves. Gait normal.   Skin: Skin is warm and dry.   Psychiatric: Affect and judgment normal.       Assessment and Plan:    1. Mood swings  REFERRAL TO GYNECOLOGY  -Chronic problem.  Unclear etiology this could be due to her mental health and/or hormonal changes related to menopause.  I am having the patient status with gynecology so they can evaluate her for hormone replacement therapy (systemic).  She has describes some vaginal dryness and discomfort so we will start her on Vagifem.  Potential  side effects and discontinuation criteria discussed with patient.   2. Atrophic vaginitis  - See above.   3. Stress incontinence  REFERRAL TO GYNECOLOGY  -Patient does report stress incontinence, this is a chronic problem.  We will have her refer for to gynecology for further evaluation.   4. Screening for cervical cancer   not necessary as the patient does not have a cervix.         Follow up: PRN

## 2020-02-12 ENCOUNTER — GYNECOLOGY VISIT (OUTPATIENT)
Dept: OBGYN | Facility: CLINIC | Age: 55
End: 2020-02-12
Payer: MEDICAID

## 2020-02-12 VITALS — WEIGHT: 145 LBS | DIASTOLIC BLOOD PRESSURE: 64 MMHG | BODY MASS INDEX: 22.05 KG/M2 | SYSTOLIC BLOOD PRESSURE: 92 MMHG

## 2020-02-12 DIAGNOSIS — N39.3 STRESS INCONTINENCE OF URINE: ICD-10-CM

## 2020-02-12 DIAGNOSIS — N95.1 MENOPAUSAL SYMPTOMS: ICD-10-CM

## 2020-02-12 DIAGNOSIS — Z12.39 SCREENING FOR BREAST CANCER: ICD-10-CM

## 2020-02-12 PROCEDURE — 99203 OFFICE O/P NEW LOW 30 MIN: CPT | Performed by: OBSTETRICS & GYNECOLOGY

## 2020-02-12 NOTE — NON-PROVIDER
Pt here to discuss HRT and stress incontinence.    Pt states she is always feeling cranky, gaining weight, cannot sleep, and having hot flashes. Pt states she has no sex drive, and vaginal dryness.  Good# [phone]  Last Mammo: 2 years ago. WNL.

## 2020-02-13 ENCOUNTER — PATIENT MESSAGE (OUTPATIENT)
Dept: MEDICAL GROUP | Facility: MEDICAL CENTER | Age: 55
End: 2020-02-13

## 2020-02-13 DIAGNOSIS — N95.2 ATROPHIC VAGINITIS: ICD-10-CM

## 2020-02-13 RX ORDER — ESTRADIOL 10 UG/1
INSERT VAGINAL
Qty: 24 TAB | Refills: 2 | Status: SHIPPED | OUTPATIENT
Start: 2020-02-13

## 2020-03-16 ENCOUNTER — TELEPHONE (OUTPATIENT)
Dept: PHYSICAL THERAPY | Facility: REHABILITATION | Age: 55
End: 2020-03-16

## 2020-03-16 NOTE — OP THERAPY DISCHARGE SUMMARY
Outpatient Physical Therapy  DISCHARGE SUMMARY NOTE      [facility] [address]  [address]  [place] [address]  Phone:  [phone]  Fax:  [phone]    Date of Visit: 03/16/2020    Patient: Sinai Lopez  YOB: 1965  MRN: [id number]     Referring Provider: No referring provider defined for this encounter.   Referring Diagnosis No admission diagnoses are documented for this encounter.     Physical Therapy Occurrence Codes    Date of onset of impairment:  9/27/19   Date physical therapy care plan established or reviewed:  10/18/19   Date physical therapy treatment started:  10/18/19          Functional Assessment Used        Your patient is being discharged from Physical Therapy with the following comments:   · Patient has failed to schedule or reschedule follow-up visits    Comments:  Patient seen for 3 visits to PT to treat impairments/limitations associated with low back/hip pain. Some progress made in PT with patient reporting reduction in symptoms. Patient failed to keep or reschedule her additional follow up appointments to no formal discharge was performed after last visit on 11/15/2019.     Limitations Remaining:  ?    Recommendations:  D/c per clinic policy    Jaye Anderson, PT, DPT    Date: 3/16/2020

## 2021-03-15 DIAGNOSIS — Z23 NEED FOR VACCINATION: ICD-10-CM
